# Patient Record
Sex: FEMALE | Race: WHITE | NOT HISPANIC OR LATINO | Employment: UNEMPLOYED | URBAN - METROPOLITAN AREA
[De-identification: names, ages, dates, MRNs, and addresses within clinical notes are randomized per-mention and may not be internally consistent; named-entity substitution may affect disease eponyms.]

---

## 2023-03-05 PROBLEM — I82.409 DVT (DEEP VENOUS THROMBOSIS) (MULTI): Status: ACTIVE | Noted: 2023-03-05

## 2023-03-05 PROBLEM — R42 VERTIGO: Status: ACTIVE | Noted: 2023-03-05

## 2023-03-05 PROBLEM — H01.009 BLEPHARITIS: Status: ACTIVE | Noted: 2023-03-05

## 2023-03-05 PROBLEM — I48.91 ATRIAL FIBRILLATION (MULTI): Status: ACTIVE | Noted: 2023-03-05

## 2023-03-05 PROBLEM — M05.9 RHEUMATOID ARTHRITIS WITH RHEUMATOID FACTOR (MULTI): Status: ACTIVE | Noted: 2023-03-05

## 2023-03-05 PROBLEM — K27.9 PUD (PEPTIC ULCER DISEASE): Status: ACTIVE | Noted: 2023-03-05

## 2023-03-05 PROBLEM — H43.399 VITREOUS FLOATERS: Status: ACTIVE | Noted: 2023-03-05

## 2023-03-05 PROBLEM — E61.1 LOW IRON: Status: ACTIVE | Noted: 2023-03-05

## 2023-03-05 PROBLEM — M19.90 OSTEOARTHROSIS: Status: ACTIVE | Noted: 2023-03-05

## 2023-03-05 PROBLEM — H04.123 DRY EYES: Status: ACTIVE | Noted: 2023-03-05

## 2023-03-05 PROBLEM — M19.90 ARTHRITIS: Status: ACTIVE | Noted: 2023-03-05

## 2023-03-05 PROBLEM — Z96.1 PSEUDOPHAKIA OF BOTH EYES: Status: ACTIVE | Noted: 2023-03-05

## 2023-03-05 PROBLEM — I10 ESSENTIAL HYPERTENSION: Status: ACTIVE | Noted: 2023-03-05

## 2023-03-14 ENCOUNTER — NURSING HOME VISIT (OUTPATIENT)
Dept: POST ACUTE CARE | Facility: EXTERNAL LOCATION | Age: 88
End: 2023-03-14
Payer: MEDICARE

## 2023-03-14 VITALS
WEIGHT: 121 LBS | HEART RATE: 78 BPM | BODY MASS INDEX: 19.53 KG/M2 | OXYGEN SATURATION: 96 % | TEMPERATURE: 97 F | SYSTOLIC BLOOD PRESSURE: 128 MMHG | RESPIRATION RATE: 17 BRPM | DIASTOLIC BLOOD PRESSURE: 72 MMHG

## 2023-03-14 DIAGNOSIS — D52.9 ANEMIA DUE TO FOLIC ACID DEFICIENCY, UNSPECIFIED DEFICIENCY TYPE: ICD-10-CM

## 2023-03-14 DIAGNOSIS — I10 PRIMARY HYPERTENSION: Primary | ICD-10-CM

## 2023-03-14 DIAGNOSIS — F32.A DEPRESSION, UNSPECIFIED DEPRESSION TYPE: ICD-10-CM

## 2023-03-14 PROCEDURE — 99308 SBSQ NF CARE LOW MDM 20: CPT | Performed by: NURSE PRACTITIONER

## 2023-03-14 NOTE — PROGRESS NOTES
Patient ID: Marcelle Petit is a 89 y.o. female who is long term resident being seen and evaluated for multiple medical problems.  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 Denies CP, SOB, Cough, or increased edema. RA. Denies abdominal pain, N/V, diarrhea, or constipation. Denies dysuria or hematuria. Denies change in appetite. Denies insomnia. Denies current pain.      Review of Systems ROS as described in in HPI     Physical Exam  Constitutional:       Appearance: Normal appearance.   HENT:      Head: Normocephalic.      Comments: Corrective lenses     Mouth/Throat:      Mouth: Mucous membranes are moist.   Cardiovascular:      Rate and Rhythm: Normal rate.      Comments: H/O AFIB  Pulmonary:      Effort: Pulmonary effort is normal.      Breath sounds: Normal breath sounds.   Abdominal:      General: Bowel sounds are normal.      Palpations: Abdomen is soft.   Musculoskeletal:      Cervical back: Neck supple.      Comments: Unsteady gait, Wheelchair transfers/walker    Skin:     General: Skin is warm and dry.   Neurological:      Mental Status: She is alert. Mental status is at baseline.   Psychiatric:         Mood and Affect: Mood normal.

## 2023-03-14 NOTE — LETTER
Patient: Marcelle Petit  : 1926    Date: 3/14/23    Patient ID: Marcelle Petit is a 89 y.o. female who is long term resident being seen and evaluated for multiple medical problems.  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 H/H 10.7/34.4  Folic acid 1 mg by mouth daily             Other    Depression     Makes eye contact with conversation  Mirtazapine 7.5 mg by mouth every HS               HPI:  Client denies HA, dizziness, or lightheadedness. Denies CP, SOB, Cough, or increased edema. RA. Denies abdominal pain, N/V, diarrhea, or constipation. Denies dysuria or hematuria. Denies change in appetite. Denies insomnia. Denies current pain.      Review of Systems ROS as described in in HPI     Physical Exam  Constitutional:       Appearance: Normal appearance.   HENT:      Head: Normocephalic.      Comments: Corrective lenses     Mouth/Throat:      Mouth: Mucous membranes are moist.   Cardiovascular:      Rate and Rhythm: Normal rate.      Comments: H/O AFIB  Pulmonary:      Effort: Pulmonary effort is normal.      Breath sounds: Normal breath sounds.   Abdominal:      General: Bowel sounds are normal.      Palpations: Abdomen is soft.   Musculoskeletal:      Cervical back: Neck supple.      Comments: Unsteady gait, Wheelchair transfers/walker    Skin:     General: Skin is warm and dry.   Neurological:      Mental Status: She is alert. Mental status is at baseline.   Psychiatric:         Mood and Affect: Mood normal.                 Lyndsey River, APRN-CNP

## 2023-04-04 ENCOUNTER — NURSING HOME VISIT (OUTPATIENT)
Dept: POST ACUTE CARE | Facility: EXTERNAL LOCATION | Age: 88
End: 2023-04-04
Payer: MEDICARE

## 2023-04-04 DIAGNOSIS — E55.9 VITAMIN D DEFICIENCY: ICD-10-CM

## 2023-04-04 DIAGNOSIS — F32.0 CURRENT MILD EPISODE OF MAJOR DEPRESSIVE DISORDER, UNSPECIFIED WHETHER RECURRENT (CMS-HCC): ICD-10-CM

## 2023-04-04 DIAGNOSIS — I10 PRIMARY HYPERTENSION: Primary | ICD-10-CM

## 2023-04-04 PROCEDURE — 99308 SBSQ NF CARE LOW MDM 20: CPT | Performed by: NURSE PRACTITIONER

## 2023-04-04 NOTE — LETTER
Patient: Marcelle Petit  : 1926    Encounter Date: 2023    Patient ID: Marcelle Petit is a 89 y.o. female who is long term resident being seen and evaluated for multiple medical problems.  [id number]   1926    /86   Pulse 80   Temp 36.7 °C (98 °F)   Resp 16   Wt 55.8 kg (123 lb)   SpO2 97%   BMI 19.85 kg/m²     Assessment/Plan  Problem List Items Addressed This Visit          Circulatory    Primary hypertension - Primary     Losartan 50 mg by mouth daily   Cardizem  mg by mouth daily  ASA 81 mg by mouth daily             Endocrine/Metabolic    Vitamin D deficiency     D3 2000 units by mouth daily   3/22/2023 D- 23.6            Other    Depression     Makes eye contact with conversation  Mirtazapine 7.5 mg by mouth every HS               HPI: No reported behavioral issues, Dementia.  Client denies HA, dizziness, or lightheadedness. Denies CP, SOB, or increased edema. RA. Denies abdominal pain, N/V, diarrhea, or constipation. Denies dysuria. Denies change in appetite. Denies insomnia. Denies current pain.      Review of Systems Limited ROS -Dementia     Physical Exam  Constitutional:       Comments: Tired  Sitting in recliner      HENT:      Head: Normocephalic.      Mouth/Throat:      Mouth: Mucous membranes are moist.   Cardiovascular:      Rate and Rhythm: Normal rate.      Comments: H/O PAF  Pulmonary:      Effort: Pulmonary effort is normal.      Breath sounds: Normal breath sounds.      Comments: RA  Abdominal:      General: Bowel sounds are normal.   Genitourinary:     Comments: Denies dysuria   Musculoskeletal:      Cervical back: Neck supple.      Comments: Physical deconditioning    Skin:     General: Skin is warm and dry.   Neurological:      Mental Status: She is alert. Mental status is at baseline.   Psychiatric:         Mood and Affect: Mood normal.      Comments: No current heightened anxiety                    Electronically Signed By: VIKKI Echols   23  8:04  AM

## 2023-04-05 VITALS
BODY MASS INDEX: 19.85 KG/M2 | WEIGHT: 123 LBS | OXYGEN SATURATION: 97 % | DIASTOLIC BLOOD PRESSURE: 86 MMHG | RESPIRATION RATE: 16 BRPM | TEMPERATURE: 98 F | HEART RATE: 80 BPM | SYSTOLIC BLOOD PRESSURE: 130 MMHG

## 2023-04-05 PROBLEM — E55.9 VITAMIN D DEFICIENCY: Status: ACTIVE | Noted: 2023-04-05

## 2023-04-05 NOTE — PROGRESS NOTES
Patient ID: Marcelle Petit is a 89 y.o. female who is long term resident being seen and evaluated for multiple medical problems.  [id number]   7/6/1926    /86   Pulse 80   Temp 36.7 °C (98 °F)   Resp 16   Wt 55.8 kg (123 lb)   SpO2 97%   BMI 19.85 kg/m²     Assessment/Plan  Problem List Items Addressed This Visit          Circulatory    Primary hypertension - Primary     Losartan 50 mg by mouth daily   Cardizem  mg by mouth daily  ASA 81 mg by mouth daily             Endocrine/Metabolic    Vitamin D deficiency     D3 2000 units by mouth daily   3/22/2023 D- 23.6            Other    Depression     Makes eye contact with conversation  Mirtazapine 7.5 mg by mouth every HS               HPI: No reported behavioral issues, Dementia.  Client denies HA, dizziness, or lightheadedness. Denies CP, SOB, or increased edema. RA. Denies abdominal pain, N/V, diarrhea, or constipation. Denies dysuria. Denies change in appetite. Denies insomnia. Denies current pain.      Review of Systems Limited ROS -Dementia     Physical Exam  Constitutional:       Comments: Tired  Sitting in recliner      HENT:      Head: Normocephalic.      Mouth/Throat:      Mouth: Mucous membranes are moist.   Cardiovascular:      Rate and Rhythm: Normal rate.      Comments: H/O PAF  Pulmonary:      Effort: Pulmonary effort is normal.      Breath sounds: Normal breath sounds.      Comments: RA  Abdominal:      General: Bowel sounds are normal.   Genitourinary:     Comments: Denies dysuria   Musculoskeletal:      Cervical back: Neck supple.      Comments: Physical deconditioning    Skin:     General: Skin is warm and dry.   Neurological:      Mental Status: She is alert. Mental status is at baseline.   Psychiatric:         Mood and Affect: Mood normal.      Comments: No current heightened anxiety

## 2023-05-30 ENCOUNTER — NURSING HOME VISIT (OUTPATIENT)
Dept: POST ACUTE CARE | Facility: EXTERNAL LOCATION | Age: 88
End: 2023-05-30
Payer: MEDICARE

## 2023-05-30 DIAGNOSIS — F32.0 CURRENT MILD EPISODE OF MAJOR DEPRESSIVE DISORDER, UNSPECIFIED WHETHER RECURRENT (CMS-HCC): ICD-10-CM

## 2023-05-30 DIAGNOSIS — I10 PRIMARY HYPERTENSION: ICD-10-CM

## 2023-05-30 DIAGNOSIS — E55.9 VITAMIN D DEFICIENCY: Primary | ICD-10-CM

## 2023-05-30 PROCEDURE — 99308 SBSQ NF CARE LOW MDM 20: CPT | Performed by: NURSE PRACTITIONER

## 2023-05-30 NOTE — LETTER
Patient: Marcelle Petit  : 1926    Encounter Date: 2023    Patient ID: Marcelle Petit is a 89 y.o. female who is long term resident being seen and evaluated for multiple medical problems.  [id number]   1926    /82   Pulse 76   Temp 36.7 °C (98 °F)   Resp 16   Wt 53.8 kg (118 lb 8 oz)   SpO2 97%   BMI 19.13 kg/m²     Assessment/Plan  Problem List Items Addressed This Visit          Circulatory    Primary hypertension     Losartan 50 mg by mouth daily   Cardizem  mg by mouth daily  ASA 81 mg by mouth daily             Endocrine/Metabolic    Vitamin D deficiency - Primary     D3 2000 units by mouth daily   3/22/2023 D- 23.6            Other    Depression     Mirtazapine 7.5 mg by mouth every HS                HPI: Client denies HA, dizziness, or lightheadedness. Denies CP, SOB, Cough, or increased edema. RA. Denies abdominal pain, N/V, diarrhea, or constipation. Denies dysuria. Denies change in appetite. Denies insomnia. Denies current pain.      Review of Systems Limited ROS memory impairment     Physical Exam  Constitutional:       Appearance: Normal appearance.      Comments: Sitting in recliner    HENT:      Head: Normocephalic.      Mouth/Throat:      Mouth: Mucous membranes are moist.   Cardiovascular:      Rate and Rhythm: Normal rate.   Pulmonary:      Effort: Pulmonary effort is normal.      Breath sounds: Normal breath sounds.      Comments: RA  Abdominal:      General: Bowel sounds are normal.   Musculoskeletal:      Cervical back: Neck supple.      Comments: WC  Walker    Skin:     General: Skin is warm and dry.   Neurological:      Mental Status: She is alert. Mental status is at baseline.   Psychiatric:         Mood and Affect: Mood normal.      Comments: Follows direction                    Electronically Signed By: VIKKI Echols   23  6:11 PM

## 2023-05-31 VITALS
RESPIRATION RATE: 16 BRPM | HEART RATE: 76 BPM | DIASTOLIC BLOOD PRESSURE: 82 MMHG | WEIGHT: 118.5 LBS | OXYGEN SATURATION: 97 % | TEMPERATURE: 98 F | BODY MASS INDEX: 19.13 KG/M2 | SYSTOLIC BLOOD PRESSURE: 126 MMHG

## 2023-05-31 NOTE — PROGRESS NOTES
Patient ID: Marcelle Petit is a 89 y.o. female who is long term resident being seen and evaluated for multiple medical problems.  [id number]   7/6/1926    /82   Pulse 76   Temp 36.7 °C (98 °F)   Resp 16   Wt 53.8 kg (118 lb 8 oz)   SpO2 97%   BMI 19.13 kg/m²     Assessment/Plan  Problem List Items Addressed This Visit          Circulatory    Primary hypertension     Losartan 50 mg by mouth daily   Cardizem  mg by mouth daily  ASA 81 mg by mouth daily             Endocrine/Metabolic    Vitamin D deficiency - Primary     D3 2000 units by mouth daily   3/22/2023 D- 23.6            Other    Depression     Mirtazapine 7.5 mg by mouth every HS                HPI: Client denies HA, dizziness, or lightheadedness. Denies CP, SOB, Cough, or increased edema. RA. Denies abdominal pain, N/V, diarrhea, or constipation. Denies dysuria. Denies change in appetite. Denies insomnia. Denies current pain.      Review of Systems Limited ROS memory impairment     Physical Exam  Constitutional:       Appearance: Normal appearance.      Comments: Sitting in recliner    HENT:      Head: Normocephalic.      Mouth/Throat:      Mouth: Mucous membranes are moist.   Cardiovascular:      Rate and Rhythm: Normal rate.   Pulmonary:      Effort: Pulmonary effort is normal.      Breath sounds: Normal breath sounds.      Comments: RA  Abdominal:      General: Bowel sounds are normal.   Musculoskeletal:      Cervical back: Neck supple.      Comments: WC  Walker    Skin:     General: Skin is warm and dry.   Neurological:      Mental Status: She is alert. Mental status is at baseline.   Psychiatric:         Mood and Affect: Mood normal.      Comments: Follows direction

## 2023-07-04 ENCOUNTER — NURSING HOME VISIT (OUTPATIENT)
Dept: POST ACUTE CARE | Facility: EXTERNAL LOCATION | Age: 88
End: 2023-07-04
Payer: MEDICARE

## 2023-07-04 DIAGNOSIS — M05.9 RHEUMATOID ARTHRITIS WITH POSITIVE RHEUMATOID FACTOR, INVOLVING UNSPECIFIED SITE (MULTI): Primary | ICD-10-CM

## 2023-07-04 DIAGNOSIS — E55.9 VITAMIN D DEFICIENCY: ICD-10-CM

## 2023-07-04 DIAGNOSIS — I10 PRIMARY HYPERTENSION: ICD-10-CM

## 2023-07-04 PROCEDURE — 99308 SBSQ NF CARE LOW MDM 20: CPT | Performed by: NURSE PRACTITIONER

## 2023-07-04 NOTE — LETTER
Patient: Marcelle Petit  : 1926    Encounter Date: 2023    Patient ID: Marcelle Petit is a 89 y.o. female who is long term resident being seen and evaluated for multiple medical problems.  [id number]   1926    /82   Pulse 76   Temp 36.4 °C (97.6 °F)   Resp 15   Wt 55.8 kg (123 lb)   SpO2 97%   BMI 19.85 kg/m²     Assessment/Plan  Problem List Items Addressed This Visit          Cardiac and Vasculature    Primary hypertension     Losartan 50 mg by mouth daily   Cardizem  mg by mouth daily  ASA 81 mg by mouth daily             Endocrine/Metabolic    Vitamin D deficiency     D3 2000 units by mouth daily             Multi-system (Lupus, Sarcoid)    Rheumatoid arthritis with rheumatoid factor (CMS/HCC) - Primary     Methotrexate 7.5 mg by mouth every Wednesday   Calcium/D3 600 mg/ 5 mcg by mouth BID               HPI: Client denies HA, dizziness, or lightheadedness. Denies CP, SOB, Cough, or increased edema. RA. Denies abdominal pain, N/V, diarrhea, or constipation. Denies dysuria. Denies change in appetite. Denies insomnia. Denies current pain.      Review of Systems Limited ROS. Poor historian     Physical Exam  Constitutional:       Comments: Resting in bed    HENT:      Head: Normocephalic.      Mouth/Throat:      Mouth: Mucous membranes are moist.   Cardiovascular:      Rate and Rhythm: Normal rate.      Comments: H/O PAF  Pulmonary:      Effort: Pulmonary effort is normal.      Breath sounds: Normal breath sounds.      Comments: RA  Abdominal:      General: Bowel sounds are normal.   Genitourinary:     Comments: Denies dysuria   Musculoskeletal:      Cervical back: Neck supple.      Comments: Walker  WC transfers    Skin:     General: Skin is warm and dry.   Neurological:      Mental Status: She is alert. Mental status is at baseline.   Psychiatric:         Mood and Affect: Mood normal.      Comments: No current heightened anxiety                    Electronically Signed By: Lyndsey River  MOLLY-CNP   7/6/23  8:21 AM

## 2023-07-06 VITALS
HEART RATE: 76 BPM | DIASTOLIC BLOOD PRESSURE: 82 MMHG | TEMPERATURE: 97.6 F | SYSTOLIC BLOOD PRESSURE: 126 MMHG | RESPIRATION RATE: 15 BRPM | BODY MASS INDEX: 19.85 KG/M2 | OXYGEN SATURATION: 97 % | WEIGHT: 123 LBS

## 2023-07-06 PROBLEM — G30.1: Status: ACTIVE | Noted: 2023-07-06

## 2023-07-06 NOTE — PROGRESS NOTES
Patient ID: Marcelle Petit is a 89 y.o. female who is long term resident being seen and evaluated for multiple medical problems.  [id number]   7/6/1926    /82   Pulse 76   Temp 36.4 °C (97.6 °F)   Resp 15   Wt 55.8 kg (123 lb)   SpO2 97%   BMI 19.85 kg/m²     Assessment/Plan  Problem List Items Addressed This Visit          Cardiac and Vasculature    Primary hypertension     Losartan 50 mg by mouth daily   Cardizem  mg by mouth daily  ASA 81 mg by mouth daily             Endocrine/Metabolic    Vitamin D deficiency     D3 2000 units by mouth daily             Multi-system (Lupus, Sarcoid)    Rheumatoid arthritis with rheumatoid factor (CMS/HCC) - Primary     Methotrexate 7.5 mg by mouth every Wednesday   Calcium/D3 600 mg/ 5 mcg by mouth BID               HPI: Client denies HA, dizziness, or lightheadedness. Denies CP, SOB, Cough, or increased edema. RA. Denies abdominal pain, N/V, diarrhea, or constipation. Denies dysuria. Denies change in appetite. Denies insomnia. Denies current pain.      Review of Systems Limited ROS. Poor historian     Physical Exam  Constitutional:       Comments: Resting in bed    HENT:      Head: Normocephalic.      Mouth/Throat:      Mouth: Mucous membranes are moist.   Cardiovascular:      Rate and Rhythm: Normal rate.      Comments: H/O PAF  Pulmonary:      Effort: Pulmonary effort is normal.      Breath sounds: Normal breath sounds.      Comments: RA  Abdominal:      General: Bowel sounds are normal.   Genitourinary:     Comments: Denies dysuria   Musculoskeletal:      Cervical back: Neck supple.      Comments: Walker  WC transfers    Skin:     General: Skin is warm and dry.   Neurological:      Mental Status: She is alert. Mental status is at baseline.   Psychiatric:         Mood and Affect: Mood normal.      Comments: No current heightened anxiety

## 2023-08-29 ENCOUNTER — NURSING HOME VISIT (OUTPATIENT)
Dept: POST ACUTE CARE | Facility: EXTERNAL LOCATION | Age: 88
End: 2023-08-29
Payer: MEDICARE

## 2023-08-29 DIAGNOSIS — R30.0 DYSURIA: Primary | ICD-10-CM

## 2023-08-29 PROCEDURE — 99308 SBSQ NF CARE LOW MDM 20: CPT | Performed by: NURSE PRACTITIONER

## 2023-08-29 NOTE — LETTER
Patient: Marcelle Petit  : 1926    Encounter Date: 2023    Patient ID: Marcelle Petit is a 89 y.o. female who is long term resident being seen and evaluated for multiple medical problems.  [id number]   1926    /83   Pulse 76   Temp 36.7 °C (98.1 °F)   Resp 15   Wt 55.8 kg (123 lb)   SpO2 97%   BMI 19.85 kg/m²     Assessment/Plan  Problem List Items Addressed This Visit       Dysuria - Primary     Urine dip positive   Augmentin 500/125 mg by mouth BID (Stop date 2023)   2023 WBC 15.8  2023 BUN/Creat 41/0.99   Encourage fluid intake               HPI: Staff reported client C/O dysuria and foul smelling urine, urine dip positive. Prescribed Augmentin. Staff report client doing better today, afebrile and up in WC. Client denies HA, dizziness, or lightheadedness. Denies CP, SOB, or Cough.  Denies abdominal pain or N/V.  No current C/O pain.     Review of Systems ROS as described in in HPI     Physical Exam  Constitutional:       Comments: Sitting in WC   HENT:      Mouth/Throat:      Mouth: Mucous membranes are moist.   Cardiovascular:      Rate and Rhythm: Normal rate.   Pulmonary:      Effort: Pulmonary effort is normal.   Abdominal:      General: Bowel sounds are normal.   Musculoskeletal:      Cervical back: Neck supple.      Comments: WC transfers   Unsteady gait   Skin:     General: Skin is warm and dry.   Neurological:      Mental Status: She is alert. Mental status is at baseline.   Psychiatric:         Mood and Affect: Mood normal.      Comments: No current heightened anxiety                    Electronically Signed By: VIKKI Echols   23  2:26 PM

## 2023-08-30 VITALS
TEMPERATURE: 98.1 F | RESPIRATION RATE: 15 BRPM | WEIGHT: 123 LBS | HEART RATE: 76 BPM | DIASTOLIC BLOOD PRESSURE: 83 MMHG | SYSTOLIC BLOOD PRESSURE: 126 MMHG | BODY MASS INDEX: 19.85 KG/M2 | OXYGEN SATURATION: 97 %

## 2023-08-30 PROBLEM — Z96.652 STATUS POST TOTAL LEFT KNEE REPLACEMENT: Status: ACTIVE | Noted: 2023-08-30

## 2023-08-30 PROBLEM — R30.0 DYSURIA: Status: ACTIVE | Noted: 2023-08-30

## 2023-08-30 ASSESSMENT — ENCOUNTER SYMPTOMS
DEPRESSION: 0
LOSS OF SENSATION IN FEET: 0
OCCASIONAL FEELINGS OF UNSTEADINESS: 1

## 2023-08-30 NOTE — ASSESSMENT & PLAN NOTE
Urine dip positive   Augmentin 500/125 mg by mouth BID (Stop date 9/5/2023)   8/30/2023 WBC 15.8  8/30/2023 BUN/Creat 41/0.99   Encourage fluid intake

## 2023-08-30 NOTE — PROGRESS NOTES
Patient ID: Marcelle Petit is a 89 y.o. female who is long term resident being seen and evaluated for multiple medical problems.  [id number]   7/6/1926    /83   Pulse 76   Temp 36.7 °C (98.1 °F)   Resp 15   Wt 55.8 kg (123 lb)   SpO2 97%   BMI 19.85 kg/m²     Assessment/Plan  Problem List Items Addressed This Visit       Dysuria - Primary     Urine dip positive   Augmentin 500/125 mg by mouth BID (Stop date 9/5/2023)   8/30/2023 WBC 15.8  8/30/2023 BUN/Creat 41/0.99   Encourage fluid intake               HPI: Staff reported client C/O dysuria and foul smelling urine, urine dip positive. Prescribed Augmentin. Staff report client doing better today, afebrile and up in WC. Client denies HA, dizziness, or lightheadedness. Denies CP, SOB, or Cough.  Denies abdominal pain or N/V.  No current C/O pain.     Review of Systems ROS as described in in HPI     Physical Exam  Constitutional:       Comments: Sitting in WC   HENT:      Mouth/Throat:      Mouth: Mucous membranes are moist.   Cardiovascular:      Rate and Rhythm: Normal rate.   Pulmonary:      Effort: Pulmonary effort is normal.   Abdominal:      General: Bowel sounds are normal.   Musculoskeletal:      Cervical back: Neck supple.      Comments: WC transfers   Unsteady gait   Skin:     General: Skin is warm and dry.   Neurological:      Mental Status: She is alert. Mental status is at baseline.   Psychiatric:         Mood and Affect: Mood normal.      Comments: No current heightened anxiety

## 2023-09-05 ENCOUNTER — NURSING HOME VISIT (OUTPATIENT)
Dept: POST ACUTE CARE | Facility: EXTERNAL LOCATION | Age: 88
End: 2023-09-05
Payer: MEDICARE

## 2023-09-05 DIAGNOSIS — R30.0 DYSURIA: Primary | ICD-10-CM

## 2023-09-05 DIAGNOSIS — F32.0 CURRENT MILD EPISODE OF MAJOR DEPRESSIVE DISORDER, UNSPECIFIED WHETHER RECURRENT (CMS-HCC): ICD-10-CM

## 2023-09-05 PROCEDURE — 99308 SBSQ NF CARE LOW MDM 20: CPT | Performed by: NURSE PRACTITIONER

## 2023-09-05 NOTE — LETTER
Patient: Marcelle Petit  : 1926    Encounter Date: 2023    Patient ID: Marcelle Petit is a 89 y.o. female who is long term resident being seen and evaluated for multiple medical problems.  [id number]   1926    /82   Pulse 76   Temp 36.1 °C (97 °F)   Resp 15   Wt 55.8 kg (123 lb)   SpO2 97%   BMI 19.85 kg/m²     Assessment/Plan  Problem List Items Addressed This Visit       Depression     Sertraline 50 mg by mouth daily          Dysuria - Primary     Urine dip positive   Augmentin 500/125 mg by mouth BID (Stop date 2023)   2023 WBC 15.8  2023 BUN/Creat 41/0.99   Encourage fluid intake               HPI: Client continues to receive Augmentin for a UTI. Client [place]. Client denies HA, dizziness, or lightheadedness. Denies CP, SOB, Cough, or increased edema. Denies abdominal pain, N/V, diarrhea, or constipation. Denies current dysuria. Denies change in appetite. Denies current pain.      Review of Systems: Limited, poor historian     Physical Exam  Constitutional:       Comments: Sitting in WC   HENT:      Mouth/Throat:      Mouth: Mucous membranes are moist.   Cardiovascular:      Rate and Rhythm: Normal rate.   Pulmonary:      Effort: Pulmonary effort is normal.      Breath sounds: Normal breath sounds.      Comments: RA  Abdominal:      General: Bowel sounds are normal.   Genitourinary:     Comments: Denies dysuria   Musculoskeletal:      Cervical back: Neck supple.      Right lower leg: Edema (slight) present.      Left lower leg: Edema (slight) present.      Comments: WC transfers    Skin:     General: Skin is warm and dry.   Neurological:      Mental Status: She is alert. Mental status is at baseline.   Psychiatric:         Mood and Affect: Mood normal.                   Electronically Signed By: VIKKI Echols   23 10:38 AM

## 2023-09-06 VITALS
OXYGEN SATURATION: 97 % | DIASTOLIC BLOOD PRESSURE: 82 MMHG | SYSTOLIC BLOOD PRESSURE: 126 MMHG | RESPIRATION RATE: 15 BRPM | TEMPERATURE: 97 F | HEART RATE: 76 BPM | WEIGHT: 123 LBS | BODY MASS INDEX: 19.85 KG/M2

## 2023-09-06 NOTE — PROGRESS NOTES
Patient ID: Marcelle Petit is a 89 y.o. female who is long term resident being seen and evaluated for multiple medical problems.  [id number]   7/6/1926    /82   Pulse 76   Temp 36.1 °C (97 °F)   Resp 15   Wt 55.8 kg (123 lb)   SpO2 97%   BMI 19.85 kg/m²     Assessment/Plan  Problem List Items Addressed This Visit       Depression     Sertraline 50 mg by mouth daily          Dysuria - Primary     Urine dip positive   Augmentin 500/125 mg by mouth BID (Stop date 9/5/2023)   8/30/2023 WBC 15.8  8/30/2023 BUN/Creat 41/0.99   Encourage fluid intake               HPI: Client continues to receive Augmentin for a UTI. Client [place]. Client denies HA, dizziness, or lightheadedness. Denies CP, SOB, Cough, or increased edema. Denies abdominal pain, N/V, diarrhea, or constipation. Denies current dysuria. Denies change in appetite. Denies current pain.      Review of Systems: Limited, poor historian     Physical Exam  Constitutional:       Comments: Sitting in WC   HENT:      Mouth/Throat:      Mouth: Mucous membranes are moist.   Cardiovascular:      Rate and Rhythm: Normal rate.   Pulmonary:      Effort: Pulmonary effort is normal.      Breath sounds: Normal breath sounds.      Comments: RA  Abdominal:      General: Bowel sounds are normal.   Genitourinary:     Comments: Denies dysuria   Musculoskeletal:      Cervical back: Neck supple.      Right lower leg: Edema (slight) present.      Left lower leg: Edema (slight) present.      Comments: WC transfers    Skin:     General: Skin is warm and dry.   Neurological:      Mental Status: She is alert. Mental status is at baseline.   Psychiatric:         Mood and Affect: Mood normal.

## 2023-09-26 ENCOUNTER — NURSING HOME VISIT (OUTPATIENT)
Dept: POST ACUTE CARE | Facility: EXTERNAL LOCATION | Age: 88
End: 2023-09-26
Payer: MEDICARE

## 2023-09-26 DIAGNOSIS — J18.9 PNEUMONIA OF BOTH LUNGS DUE TO INFECTIOUS ORGANISM, UNSPECIFIED PART OF LUNG: ICD-10-CM

## 2023-09-26 DIAGNOSIS — I10 PRIMARY HYPERTENSION: Primary | ICD-10-CM

## 2023-09-26 PROCEDURE — 99308 SBSQ NF CARE LOW MDM 20: CPT | Performed by: NURSE PRACTITIONER

## 2023-09-26 NOTE — LETTER
Patient: Marcelle Petit  : 1926    Encounter Date: 2023    Patient ID: Marcelle Petit is a 89 y.o. female who is long term resident being seen and evaluated for multiple medical problems.  [id number]   1926    /70   Pulse 70   Temp 36.7 °C (98 °F)   Resp 16   Wt 55.8 kg (123 lb)   SpO2 94%   BMI 19.89 kg/m²     Assessment/Plan  Problem List Items Addressed This Visit       Primary hypertension - Primary     Losartan 50 mg by mouth daily   Cardizem  mg by mouth daily  ASA 81 mg by mouth daily          Pneumonia of both lungs due to infectious organism     Ceftin 500 mg by mouth BID (Stop date 2023)   Duoneb every 6 hrs as needed  3034 CXR- Bibasilar opacities  2023 WBC 25.37  2023 Repeat CBC with diff and BMP               HPI: Client continues Ceftin for Pneumonia. Staff report RA SPO2 today 91%, placed on 2 lit NC. Client denies HA, dizziness, or lightheadedness. Denies CP, SOB, or increased edema. C/O cough. O2 2 lit NC. Denies abdominal pain, N/V, diarrhea, or constipation. Denies dysuria. Denies change in appetite. Denies current pain.       Review of Systems Limited ROS, poor historian     Physical Exam  Constitutional:       Comments: Resting in bed    HENT:      Mouth/Throat:      Mouth: Mucous membranes are moist.   Cardiovascular:      Rate and Rhythm: Normal rate.      Comments: H/O PAF  Pulmonary:      Effort: Pulmonary effort is normal.      Comments: 2 lit NC  Abdominal:      General: Bowel sounds are normal.   Genitourinary:     Comments: Denies dysuria   Musculoskeletal:      Cervical back: Neck supple.      Comments: WC transfers    Skin:     General: Skin is warm and dry.   Neurological:      Mental Status: She is alert. Mental status is at baseline.   Psychiatric:         Mood and Affect: Mood normal.                   Electronically Signed By: VIKKI Echols   23  7:17 AM

## 2023-09-27 VITALS
RESPIRATION RATE: 16 BRPM | TEMPERATURE: 98 F | OXYGEN SATURATION: 94 % | DIASTOLIC BLOOD PRESSURE: 70 MMHG | WEIGHT: 123 LBS | BODY MASS INDEX: 19.89 KG/M2 | HEART RATE: 70 BPM | SYSTOLIC BLOOD PRESSURE: 110 MMHG

## 2023-09-27 PROBLEM — J18.9 PNEUMONIA OF BOTH LUNGS DUE TO INFECTIOUS ORGANISM: Status: ACTIVE | Noted: 2023-09-27

## 2023-09-27 PROBLEM — I82.409 DVT (DEEP VENOUS THROMBOSIS) (MULTI): Status: RESOLVED | Noted: 2023-03-05 | Resolved: 2023-09-27

## 2023-09-27 NOTE — PROGRESS NOTES
Patient ID: Marcelle Petit is a 89 y.o. female who is long term resident being seen and evaluated for multiple medical problems.  [id number]   7/6/1926    /70   Pulse 70   Temp 36.7 °C (98 °F)   Resp 16   Wt 55.8 kg (123 lb)   SpO2 94%   BMI 19.89 kg/m²     Assessment/Plan  Problem List Items Addressed This Visit       Primary hypertension - Primary     Losartan 50 mg by mouth daily   Cardizem  mg by mouth daily  ASA 81 mg by mouth daily          Pneumonia of both lungs due to infectious organism     Ceftin 500 mg by mouth BID (Stop date 9/30/2023)   Duoneb every 6 hrs as needed  9/20/3034 CXR- Bibasilar opacities  9/20/2023 WBC 25.37  9/27/2023 Repeat CBC with diff and BMP               HPI: Client continues Ceftin for Pneumonia. Staff report RA SPO2 today 91%, placed on 2 lit NC. Client denies HA, dizziness, or lightheadedness. Denies CP, SOB, or increased edema. C/O cough. O2 2 lit NC. Denies abdominal pain, N/V, diarrhea, or constipation. Denies dysuria. Denies change in appetite. Denies current pain.       Review of Systems Limited ROS, poor historian     Physical Exam  Constitutional:       Comments: Resting in bed    HENT:      Mouth/Throat:      Mouth: Mucous membranes are moist.   Cardiovascular:      Rate and Rhythm: Normal rate.      Comments: H/O PAF  Pulmonary:      Effort: Pulmonary effort is normal.      Comments: 2 lit NC  Abdominal:      General: Bowel sounds are normal.   Genitourinary:     Comments: Denies dysuria   Musculoskeletal:      Cervical back: Neck supple.      Comments: WC transfers    Skin:     General: Skin is warm and dry.   Neurological:      Mental Status: She is alert. Mental status is at baseline.   Psychiatric:         Mood and Affect: Mood normal.

## 2023-09-27 NOTE — ASSESSMENT & PLAN NOTE
Ceftin 500 mg by mouth BID (Stop date 9/30/2023)   Duoneb every 6 hrs as needed  9/20/3034 CXR- Bibasilar opacities  9/20/2023 WBC 25.37  9/27/2023 Repeat CBC with diff and BMP

## 2023-10-03 ENCOUNTER — NURSING HOME VISIT (OUTPATIENT)
Dept: POST ACUTE CARE | Facility: EXTERNAL LOCATION | Age: 88
End: 2023-10-03
Payer: MEDICARE

## 2023-10-03 DIAGNOSIS — I10 PRIMARY HYPERTENSION: ICD-10-CM

## 2023-10-03 DIAGNOSIS — J18.9 PNEUMONIA OF BOTH LUNGS DUE TO INFECTIOUS ORGANISM, UNSPECIFIED PART OF LUNG: Primary | ICD-10-CM

## 2023-10-03 DIAGNOSIS — F32.0 CURRENT MILD EPISODE OF MAJOR DEPRESSIVE DISORDER, UNSPECIFIED WHETHER RECURRENT (CMS-HCC): ICD-10-CM

## 2023-10-03 PROCEDURE — 99308 SBSQ NF CARE LOW MDM 20: CPT | Performed by: NURSE PRACTITIONER

## 2023-10-03 NOTE — LETTER
Patient: Marcelle Petit  : 1926    Encounter Date: 10/03/2023    Patient ID: Marcelle Petit is a 89 y.o. female who is long term resident being seen and evaluated for multiple medical problems.  [id number]   1926    /74   Pulse 78   Temp 36.7 °C (98.1 °F)   Resp 16   Wt 55.8 kg (123 lb)   SpO2 98%   BMI 19.89 kg/m²     Assessment/Plan  Problem List Items Addressed This Visit       Primary hypertension     Losartan 50 mg by mouth daily   Cardizem  mg by mouth daily  ASA 81 mg by mouth daily   H/O PAF         Depression     Mirtazapine 7.5 mg by mouth daily           Pneumonia of both lungs due to infectious organism - Primary     Ceftin 500 mg by mouth BID completed 2023   Duoneb every 6 hrs as needed  3034 CXR- Bibasilar opacities  2023 WBC 25.37  2023 WBC 14.8  10/6/2023 Repeat BMP and CBC with diff               HPI: Client completed Ceftin for Pneumonia. Client denies HA or dizziness. Denies CP or SOB. O2 3 lit NC. Denies abdominal pain, or N/V. Denies dysuria.  Staff report client's appetite has decreased. She denies current pain. She C/O feeling tired.     Review of Systems Limited ROS,limited participation     Physical Exam  Constitutional:       Comments: Resting in bed    HENT:      Mouth/Throat:      Mouth: Mucous membranes are moist.   Cardiovascular:      Rate and Rhythm: Normal rate.      Comments: H/O PAF  Pulmonary:      Effort: Pulmonary effort is normal.      Comments: 3 lit NC  Abdominal:      General: Bowel sounds are normal.   Musculoskeletal:      Cervical back: Neck supple.      Comments: WC transfers   Generalized weakness    Skin:     General: Skin is warm and dry.   Neurological:      Mental Status: She is alert. Mental status is at baseline.   Psychiatric:      Comments: No current heightened anxiety                    Electronically Signed By: VIKKI Echols   10/4/23  1:58 PM

## 2023-10-04 VITALS
HEART RATE: 78 BPM | DIASTOLIC BLOOD PRESSURE: 74 MMHG | RESPIRATION RATE: 16 BRPM | SYSTOLIC BLOOD PRESSURE: 118 MMHG | BODY MASS INDEX: 19.89 KG/M2 | WEIGHT: 123 LBS | TEMPERATURE: 98.1 F | OXYGEN SATURATION: 98 %

## 2023-10-04 NOTE — ASSESSMENT & PLAN NOTE
Ceftin 500 mg by mouth BID completed 9/30/2023   Duoneb every 6 hrs as needed  9/20/3034 CXR- Bibasilar opacities  9/20/2023 WBC 25.37  9/27/2023 WBC 14.8  10/6/2023 Repeat BMP and CBC with diff

## 2023-10-04 NOTE — PROGRESS NOTES
Patient ID: Marcelle Petit is a 89 y.o. female who is long term resident being seen and evaluated for multiple medical problems.  [id number]   7/6/1926    /74   Pulse 78   Temp 36.7 °C (98.1 °F)   Resp 16   Wt 55.8 kg (123 lb)   SpO2 98%   BMI 19.89 kg/m²     Assessment/Plan  Problem List Items Addressed This Visit       Primary hypertension     Losartan 50 mg by mouth daily   Cardizem  mg by mouth daily  ASA 81 mg by mouth daily   H/O PAF         Depression     Mirtazapine 7.5 mg by mouth daily           Pneumonia of both lungs due to infectious organism - Primary     Ceftin 500 mg by mouth BID completed 9/30/2023   Duoneb every 6 hrs as needed  9/20/3034 CXR- Bibasilar opacities  9/20/2023 WBC 25.37  9/27/2023 WBC 14.8  10/6/2023 Repeat BMP and CBC with diff               HPI: Client completed Ceftin for Pneumonia. Client denies HA or dizziness. Denies CP or SOB. O2 3 lit NC. Denies abdominal pain, or N/V. Denies dysuria.  Staff report client's appetite has decreased. She denies current pain. She C/O feeling tired.     Review of Systems Limited ROS,limited participation     Physical Exam  Constitutional:       Comments: Resting in bed    HENT:      Mouth/Throat:      Mouth: Mucous membranes are moist.   Cardiovascular:      Rate and Rhythm: Normal rate.      Comments: H/O PAF  Pulmonary:      Effort: Pulmonary effort is normal.      Comments: 3 lit NC  Abdominal:      General: Bowel sounds are normal.   Musculoskeletal:      Cervical back: Neck supple.      Comments: WC transfers   Generalized weakness    Skin:     General: Skin is warm and dry.   Neurological:      Mental Status: She is alert. Mental status is at baseline.   Psychiatric:      Comments: No current heightened anxiety

## 2023-10-10 ENCOUNTER — NURSING HOME VISIT (OUTPATIENT)
Dept: POST ACUTE CARE | Facility: EXTERNAL LOCATION | Age: 88
End: 2023-10-10
Payer: MEDICARE

## 2023-10-10 DIAGNOSIS — F41.9 ANXIETY: ICD-10-CM

## 2023-10-10 DIAGNOSIS — Z51.5 HOSPICE CARE PATIENT: Primary | ICD-10-CM

## 2023-10-10 DIAGNOSIS — I10 PRIMARY HYPERTENSION: ICD-10-CM

## 2023-10-10 DIAGNOSIS — R52 PAIN: ICD-10-CM

## 2023-10-10 PROCEDURE — 99308 SBSQ NF CARE LOW MDM 20: CPT | Performed by: NURSE PRACTITIONER

## 2023-10-10 NOTE — LETTER
Patient: Marcelle Petit  : 1926    Encounter Date: 10/10/2023    Patient ID: Marcelle Petit is a 89 y.o. female who is long term resident being seen and evaluated for multiple medical problems.  [id number]   1926    /78   Pulse 80   Temp 36.7 °C (98 °F)   Resp 17   Wt 56.2 kg (123 lb 12.8 oz)   SpO2 98%   BMI 20.02 kg/m²     Assessment/Plan  Problem List Items Addressed This Visit       Primary hypertension     Losartan 50 mg by mouth daily   Cardizem  mg by mouth daily  ASA 81 mg by mouth daily   H/O PAF         Pain     Morphine 5 mg  by mouth every 4 hrs as needed         Anxiety     Ativan 0.5 mg by mouth every 6 hrs as needed          Hospice care patient - Primary     [facility]   Cerebral atherosclerosis               HPI: Client now receiving [facility] services, Cerebral arthrosclerosis. Client denies HA or dizziness. Denies CP or SOB. O2 3 lit NC. Denies abdominal pain, N/V, diarrhea, or constipation. Denies dysuria. Staff report client has a decreased appetite. Seng denies current pain.       Review of Systems ROS as described in in HPI     Physical Exam  Constitutional:       Comments: Sitting in Broda Chair    HENT:      Mouth/Throat:      Mouth: Mucous membranes are moist.   Cardiovascular:      Rate and Rhythm: Normal rate. Rhythm irregular.   Pulmonary:      Effort: Pulmonary effort is normal.      Breath sounds: Normal breath sounds.      Comments: 3 lit NC  Abdominal:      General: Bowel sounds are normal.   Musculoskeletal:      Cervical back: Neck supple.      Comments: Generalized weakness   No leg edema   Skin:     General: Skin is warm and dry.   Neurological:      Mental Status: She is alert. Mental status is at baseline.   Psychiatric:      Comments: No current heightened anxiety                    Electronically Signed By: VIKKI Echols   10/11/23  9:13 AM

## 2023-10-11 VITALS
SYSTOLIC BLOOD PRESSURE: 126 MMHG | OXYGEN SATURATION: 98 % | TEMPERATURE: 98 F | DIASTOLIC BLOOD PRESSURE: 78 MMHG | WEIGHT: 123.8 LBS | BODY MASS INDEX: 20.02 KG/M2 | RESPIRATION RATE: 17 BRPM | HEART RATE: 80 BPM

## 2023-10-11 PROBLEM — Z51.5 HOSPICE CARE PATIENT: Status: ACTIVE | Noted: 2023-10-11

## 2023-10-11 PROBLEM — F41.9 ANXIETY: Status: ACTIVE | Noted: 2023-10-11

## 2023-10-11 PROBLEM — R52 PAIN: Status: ACTIVE | Noted: 2023-10-11

## 2023-10-11 NOTE — PROGRESS NOTES
Patient ID: Marcelle Petit is a 89 y.o. female who is long term resident being seen and evaluated for multiple medical problems.  [id number]   7/6/1926    /78   Pulse 80   Temp 36.7 °C (98 °F)   Resp 17   Wt 56.2 kg (123 lb 12.8 oz)   SpO2 98%   BMI 20.02 kg/m²     Assessment/Plan  Problem List Items Addressed This Visit       Primary hypertension     Losartan 50 mg by mouth daily   Cardizem  mg by mouth daily  ASA 81 mg by mouth daily   H/O PAF         Pain     Morphine 5 mg  by mouth every 4 hrs as needed         Anxiety     Ativan 0.5 mg by mouth every 6 hrs as needed          Hospice care patient - Primary     [facility]   Cerebral atherosclerosis               HPI: Client now receiving [facility] services, Cerebral arthrosclerosis. Client denies HA or dizziness. Denies CP or SOB. O2 3 lit NC. Denies abdominal pain, N/V, diarrhea, or constipation. Denies dysuria. Staff report client has a decreased appetite. Seng denies current pain.       Review of Systems ROS as described in in HPI     Physical Exam  Constitutional:       Comments: Sitting in Broda Chair    HENT:      Mouth/Throat:      Mouth: Mucous membranes are moist.   Cardiovascular:      Rate and Rhythm: Normal rate. Rhythm irregular.   Pulmonary:      Effort: Pulmonary effort is normal.      Breath sounds: Normal breath sounds.      Comments: 3 lit NC  Abdominal:      General: Bowel sounds are normal.   Musculoskeletal:      Cervical back: Neck supple.      Comments: Generalized weakness   No leg edema   Skin:     General: Skin is warm and dry.   Neurological:      Mental Status: She is alert. Mental status is at baseline.   Psychiatric:      Comments: No current heightened anxiety

## 2023-11-21 ENCOUNTER — NURSING HOME VISIT (OUTPATIENT)
Dept: POST ACUTE CARE | Facility: EXTERNAL LOCATION | Age: 88
End: 2023-11-21
Payer: MEDICARE

## 2023-11-21 DIAGNOSIS — Z51.5 HOSPICE CARE PATIENT: Primary | ICD-10-CM

## 2023-11-21 DIAGNOSIS — R52 PAIN: ICD-10-CM

## 2023-11-21 DIAGNOSIS — I10 PRIMARY HYPERTENSION: ICD-10-CM

## 2023-11-21 PROCEDURE — 99308 SBSQ NF CARE LOW MDM 20: CPT | Performed by: NURSE PRACTITIONER

## 2023-11-21 NOTE — LETTER
Patient: Marcelle Pteit  : 1926    Encounter Date: 2023    Patient ID: Marcelle Petit is a 89 y.o. female who is long term resident being seen and evaluated for multiple medical problems.  [id number]   1926    /78   Pulse 80   Temp 36.1 °C (97 °F)   Resp 16   SpO2 98%     Assessment/Plan  Problem List Items Addressed This Visit       Primary hypertension     Losartan 50 mg by mouth daily   Cardizem  mg by mouth daily  ASA 81 mg by mouth daily   H/O PAF         Pain     Morphine 5 mg  by mouth every 4 hrs as needed          Hospice care patient - Primary     [facility]   Cerebral atherosclerosis               HPI: Client continues to receive [facility] services. Client [place]. Client denies HA or dizziness. Denies CP or SOB. Denies abdominal pain or N/V. Denies dysuria. Denies change in appetite. No current C/O pain or signs of heightened anxiety.      Review of Systems Poor historian, completed with staff assist     Physical Exam  Constitutional:       Comments: Resting in bed    HENT:      Head: Normocephalic.      Mouth/Throat:      Mouth: Mucous membranes are moist.   Cardiovascular:      Rate and Rhythm: Normal rate.      Comments: PAF  Pulmonary:      Effort: Pulmonary effort is normal.      Comments: 2 lit NC  Abdominal:      General: Bowel sounds are normal.   Musculoskeletal:      Cervical back: Neck supple.      Comments: WC transfers   Skin:     General: Skin is warm and dry.   Neurological:      Mental Status: She is alert. Mental status is at baseline.   Psychiatric:         Mood and Affect: Mood normal.      Comments: No current heightened anxiety                    Electronically Signed By: VIKKI Echols   23  9:34 AM

## 2023-11-26 VITALS
SYSTOLIC BLOOD PRESSURE: 126 MMHG | TEMPERATURE: 97 F | DIASTOLIC BLOOD PRESSURE: 78 MMHG | OXYGEN SATURATION: 98 % | HEART RATE: 80 BPM | RESPIRATION RATE: 16 BRPM

## 2023-11-26 NOTE — PROGRESS NOTES
Patient ID: Marcelle Petit is a 89 y.o. female who is long term resident being seen and evaluated for multiple medical problems.  [id number]   7/6/1926    /78   Pulse 80   Temp 36.1 °C (97 °F)   Resp 16   SpO2 98%     Assessment/Plan  Problem List Items Addressed This Visit       Primary hypertension     Losartan 50 mg by mouth daily   Cardizem  mg by mouth daily  ASA 81 mg by mouth daily   H/O PAF         Pain     Morphine 5 mg  by mouth every 4 hrs as needed          Hospice care patient - Primary     [facility]   Cerebral atherosclerosis               HPI: Client continues to receive [facility] services. Client [place]. Client denies HA or dizziness. Denies CP or SOB. Denies abdominal pain or N/V. Denies dysuria. Denies change in appetite. No current C/O pain or signs of heightened anxiety.      Review of Systems Poor historian, completed with staff assist     Physical Exam  Constitutional:       Comments: Resting in bed    HENT:      Head: Normocephalic.      Mouth/Throat:      Mouth: Mucous membranes are moist.   Cardiovascular:      Rate and Rhythm: Normal rate.      Comments: PAF  Pulmonary:      Effort: Pulmonary effort is normal.      Comments: 2 lit NC  Abdominal:      General: Bowel sounds are normal.   Musculoskeletal:      Cervical back: Neck supple.      Comments: WC transfers   Skin:     General: Skin is warm and dry.   Neurological:      Mental Status: She is alert. Mental status is at baseline.   Psychiatric:         Mood and Affect: Mood normal.      Comments: No current heightened anxiety

## 2023-12-28 ENCOUNTER — NURSING HOME VISIT (OUTPATIENT)
Dept: POST ACUTE CARE | Facility: EXTERNAL LOCATION | Age: 88
End: 2023-12-28
Payer: MEDICARE

## 2023-12-28 DIAGNOSIS — Z51.5 HOSPICE CARE PATIENT: Primary | ICD-10-CM

## 2023-12-28 DIAGNOSIS — R52 PAIN: ICD-10-CM

## 2023-12-28 DIAGNOSIS — F32.0 CURRENT MILD EPISODE OF MAJOR DEPRESSIVE DISORDER, UNSPECIFIED WHETHER RECURRENT (CMS-HCC): ICD-10-CM

## 2023-12-28 DIAGNOSIS — I10 PRIMARY HYPERTENSION: ICD-10-CM

## 2023-12-28 PROCEDURE — 99308 SBSQ NF CARE LOW MDM 20: CPT | Performed by: NURSE PRACTITIONER

## 2023-12-28 NOTE — LETTER
Patient: Marcelle Petit  : 1926    Encounter Date: 2023    Patient ID: Marcelle Petit is a 89 y.o. female who is long term resident being seen and evaluated for multiple medical problems.  [id number]   1926    /78   Pulse 80   Temp 36.7 °C (98 °F)   Resp 16   Wt 58.1 kg (128 lb)   SpO2 98%   BMI 20.69 kg/m²     Assessment/Plan  Problem List Items Addressed This Visit       Primary hypertension     Losartan 50 mg by mouth daily   Cardizem  mg by mouth daily  ASA 81 mg by mouth daily   H/O PAF         Depression     Mirtazapine 15 mg by mouth daily            Pain     Morphine 5 mg  by mouth every 4 hrs as needed           Hospice care patient - Primary     [facility]   Cerebral atherosclerosis               HPI: Client denies HA, dizziness, or lightheadedness. Denies CP, SOB, or increased edema. O2 2 lit NC. Denies abdominal pain, N/V, diarrhea, or constipation. Denies dysuria. Denies change in appetite. Denies current pain.      Review of Systems Limited ROS, poor historian     Physical Exam  Constitutional:       Comments: Resting in bed    HENT:      Mouth/Throat:      Mouth: Mucous membranes are moist.   Cardiovascular:      Rate and Rhythm: Normal rate.      Comments: PAF  Pulmonary:      Effort: Pulmonary effort is normal.      Breath sounds: Normal breath sounds.      Comments: 2 lit NC  Abdominal:      General: Bowel sounds are normal.   Musculoskeletal:      Cervical back: Neck supple.      Comments: No leg edema    Skin:     General: Skin is warm and dry.   Neurological:      Mental Status: She is alert. Mental status is at baseline.   Psychiatric:         Mood and Affect: Mood normal.                   Electronically Signed By: VIKKI Echols   23  7:02 AM

## 2023-12-29 VITALS
BODY MASS INDEX: 20.69 KG/M2 | TEMPERATURE: 98 F | SYSTOLIC BLOOD PRESSURE: 126 MMHG | WEIGHT: 128 LBS | RESPIRATION RATE: 16 BRPM | DIASTOLIC BLOOD PRESSURE: 78 MMHG | HEART RATE: 80 BPM | OXYGEN SATURATION: 98 %

## 2023-12-29 NOTE — PROGRESS NOTES
Patient ID: Marcelle Petit is a 89 y.o. female who is long term resident being seen and evaluated for multiple medical problems.  [id number]   7/6/1926    /78   Pulse 80   Temp 36.7 °C (98 °F)   Resp 16   Wt 58.1 kg (128 lb)   SpO2 98%   BMI 20.69 kg/m²     Assessment/Plan  Problem List Items Addressed This Visit       Primary hypertension     Losartan 50 mg by mouth daily   Cardizem  mg by mouth daily  ASA 81 mg by mouth daily   H/O PAF         Depression     Mirtazapine 15 mg by mouth daily            Pain     Morphine 5 mg  by mouth every 4 hrs as needed           Hospice care patient - Primary     [facility]   Cerebral atherosclerosis               HPI: Client denies HA, dizziness, or lightheadedness. Denies CP, SOB, or increased edema. O2 2 lit NC. Denies abdominal pain, N/V, diarrhea, or constipation. Denies dysuria. Denies change in appetite. Denies current pain.      Review of Systems Limited ROS, poor historian     Physical Exam  Constitutional:       Comments: Resting in bed    HENT:      Mouth/Throat:      Mouth: Mucous membranes are moist.   Cardiovascular:      Rate and Rhythm: Normal rate.      Comments: PAF  Pulmonary:      Effort: Pulmonary effort is normal.      Breath sounds: Normal breath sounds.      Comments: 2 lit NC  Abdominal:      General: Bowel sounds are normal.   Musculoskeletal:      Cervical back: Neck supple.      Comments: No leg edema    Skin:     General: Skin is warm and dry.   Neurological:      Mental Status: She is alert. Mental status is at baseline.   Psychiatric:         Mood and Affect: Mood normal.

## 2024-01-30 ENCOUNTER — NURSING HOME VISIT (OUTPATIENT)
Dept: POST ACUTE CARE | Facility: EXTERNAL LOCATION | Age: 89
End: 2024-01-30
Payer: MEDICARE

## 2024-01-30 DIAGNOSIS — I10 PRIMARY HYPERTENSION: ICD-10-CM

## 2024-01-30 DIAGNOSIS — F32.A DEPRESSION, UNSPECIFIED DEPRESSION TYPE: ICD-10-CM

## 2024-01-30 DIAGNOSIS — Z51.5 HOSPICE CARE PATIENT: Primary | ICD-10-CM

## 2024-01-30 DIAGNOSIS — R52 PAIN: ICD-10-CM

## 2024-01-30 PROCEDURE — 99308 SBSQ NF CARE LOW MDM 20: CPT | Performed by: NURSE PRACTITIONER

## 2024-01-30 NOTE — LETTER
Patient: Marcelle Petit  : 1926    Encounter Date: 2024    Patient ID: Marcelle Petit is a 89 y.o. female who is long term resident being seen and evaluated for multiple medical problems.  [id number]   1926    /78   Pulse 80   Temp 36.7 °C (98 °F)   Resp 16   Wt 60.6 kg (133 lb 9.6 oz)   SpO2 98%   BMI 21.60 kg/m²     Assessment/Plan  Problem List Items Addressed This Visit       Primary hypertension     Losartan 50 mg by mouth daily   Cardizem  mg by mouth daily  H/O PAF         Depression     Mirtazapine 15 mg by mouth daily             Pain     Morphine 5 mg  by mouth every 4 hrs as needed            Hospice care patient - Primary     [facility]   Cerebral atherosclerosis              HPI: Client continues to receive [facility] services. Client denies HA, dizziness, or lightheadedness. Denies CP, SOB, or increased edema. O2 2 lit NC. Denies abdominal pain, N/V, diarrhea, or constipation. Denies dysuria. Denies change in appetite. She denies current pain.     Review of Systems Limited ROS, Poor historian     Physical Exam  Constitutional:       Comments: Resting in bed    HENT:      Mouth/Throat:      Mouth: Mucous membranes are moist.   Cardiovascular:      Rate and Rhythm: Normal rate.      Comments: PAF  Pulmonary:      Effort: Pulmonary effort is normal.      Comments: 2 lit NC  Abdominal:      General: Bowel sounds are normal.   Genitourinary:     Comments: Denies dysuria   Musculoskeletal:      Cervical back: Neck supple.      Comments: No leg edema    Skin:     General: Skin is warm and dry.   Neurological:      Mental Status: She is alert. Mental status is at baseline.   Psychiatric:         Mood and Affect: Mood normal.      Comments: Appears comfortable  Follows direction                    Electronically Signed By: VIKKI Echols   24  3:23 PM

## 2024-02-02 VITALS
SYSTOLIC BLOOD PRESSURE: 126 MMHG | OXYGEN SATURATION: 98 % | RESPIRATION RATE: 16 BRPM | HEART RATE: 80 BPM | BODY MASS INDEX: 21.6 KG/M2 | WEIGHT: 133.6 LBS | TEMPERATURE: 98 F | DIASTOLIC BLOOD PRESSURE: 78 MMHG

## 2024-02-02 PROBLEM — R30.0 DYSURIA: Status: RESOLVED | Noted: 2023-08-30 | Resolved: 2024-02-02

## 2024-02-02 PROBLEM — J18.9 PNEUMONIA OF BOTH LUNGS DUE TO INFECTIOUS ORGANISM: Status: RESOLVED | Noted: 2023-09-27 | Resolved: 2024-02-02

## 2024-02-02 NOTE — PROGRESS NOTES
Patient ID: Marcelle Petit is a 89 y.o. female who is long term resident being seen and evaluated for multiple medical problems.  [id number]   7/6/1926    /78   Pulse 80   Temp 36.7 °C (98 °F)   Resp 16   Wt 60.6 kg (133 lb 9.6 oz)   SpO2 98%   BMI 21.60 kg/m²     Assessment/Plan  Problem List Items Addressed This Visit       Primary hypertension     Losartan 50 mg by mouth daily   Cardizem  mg by mouth daily  H/O PAF         Depression     Mirtazapine 15 mg by mouth daily             Pain     Morphine 5 mg  by mouth every 4 hrs as needed            Hospice care patient - Primary     [facility]   Cerebral atherosclerosis              HPI: Client continues to receive [facility] services. Client denies HA, dizziness, or lightheadedness. Denies CP, SOB, or increased edema. O2 2 lit NC. Denies abdominal pain, N/V, diarrhea, or constipation. Denies dysuria. Denies change in appetite. She denies current pain.     Review of Systems Limited ROS, Poor historian     Physical Exam  Constitutional:       Comments: Resting in bed    HENT:      Mouth/Throat:      Mouth: Mucous membranes are moist.   Cardiovascular:      Rate and Rhythm: Normal rate.      Comments: PAF  Pulmonary:      Effort: Pulmonary effort is normal.      Comments: 2 lit NC  Abdominal:      General: Bowel sounds are normal.   Genitourinary:     Comments: Denies dysuria   Musculoskeletal:      Cervical back: Neck supple.      Comments: No leg edema    Skin:     General: Skin is warm and dry.   Neurological:      Mental Status: She is alert. Mental status is at baseline.   Psychiatric:         Mood and Affect: Mood normal.      Comments: Appears comfortable  Follows direction

## 2024-03-05 ENCOUNTER — NURSING HOME VISIT (OUTPATIENT)
Dept: POST ACUTE CARE | Facility: EXTERNAL LOCATION | Age: 89
End: 2024-03-05
Payer: MEDICARE

## 2024-03-05 DIAGNOSIS — F32.0 CURRENT MILD EPISODE OF MAJOR DEPRESSIVE DISORDER, UNSPECIFIED WHETHER RECURRENT (CMS-HCC): ICD-10-CM

## 2024-03-05 DIAGNOSIS — I10 PRIMARY HYPERTENSION: ICD-10-CM

## 2024-03-05 DIAGNOSIS — Z51.5 HOSPICE CARE PATIENT: Primary | ICD-10-CM

## 2024-03-05 DIAGNOSIS — R52 PAIN: ICD-10-CM

## 2024-03-05 PROCEDURE — 99308 SBSQ NF CARE LOW MDM 20: CPT | Performed by: NURSE PRACTITIONER

## 2024-03-05 NOTE — LETTER
Patient: Marcelle Petit  : 1926    Encounter Date: 2024    Patient ID: Marcelle Petit is a 89 y.o. female who is long term resident being seen and evaluated for multiple medical problems.  [id number]   1926    /78   Pulse 80   Temp 36.8 °C (98.2 °F)   Resp 16   Wt 61.7 kg (136 lb)   SpO2 98%   BMI 21.99 kg/m²     Assessment/Plan  Problem List Items Addressed This Visit       Primary hypertension     Losartan 50 mg by mouth daily   Cardizem  mg by mouth daily  H/O PAF         Depression     Mirtazapine 15 mg by mouth daily            Pain     Morphine 5 mg  by mouth every 4 hrs as needed            Hospice care patient - Primary     [facility]   Cerebral atherosclerosis          HPI: Client continues to receive [facility] services. ROS completed with staff assist. No reported CP, SOB, or cough. O2 2 lit NC. No abdominal pain or N/V. No urinary concerns. She has no current signs of pain, heightened anxiety, or tachypnea.     Review of Systems Completed with staff assist    Physical Exam  Constitutional:       Comments: Sitting in Broda Chair    HENT:      Mouth/Throat:      Mouth: Mucous membranes are moist.   Cardiovascular:      Rate and Rhythm: Normal rate.   Pulmonary:      Effort: Pulmonary effort is normal.      Comments: 2 lit NC  Abdominal:      General: Bowel sounds are normal.   Skin:     General: Skin is warm and dry.   Neurological:      Mental Status: She is alert.   Psychiatric:      Comments: No signs heightened anxiety                    Electronically Signed By: VIKKI Echols   3/6/24  6:55 AM

## 2024-03-06 VITALS
TEMPERATURE: 98.2 F | SYSTOLIC BLOOD PRESSURE: 126 MMHG | RESPIRATION RATE: 16 BRPM | BODY MASS INDEX: 21.99 KG/M2 | DIASTOLIC BLOOD PRESSURE: 78 MMHG | WEIGHT: 136 LBS | HEART RATE: 80 BPM | OXYGEN SATURATION: 98 %

## 2024-03-06 NOTE — PROGRESS NOTES
Patient ID: Marcelle Petit is a 89 y.o. female who is long term resident being seen and evaluated for multiple medical problems.  [id number]   7/6/1926    /78   Pulse 80   Temp 36.8 °C (98.2 °F)   Resp 16   Wt 61.7 kg (136 lb)   SpO2 98%   BMI 21.99 kg/m²     Assessment/Plan  Problem List Items Addressed This Visit       Primary hypertension     Losartan 50 mg by mouth daily   Cardizem  mg by mouth daily  H/O PAF         Depression     Mirtazapine 15 mg by mouth daily            Pain     Morphine 5 mg  by mouth every 4 hrs as needed            Hospice care patient - Primary     [facility]   Cerebral atherosclerosis          HPI: Client continues to receive [facility] services. ROS completed with staff assist. No reported CP, SOB, or cough. O2 2 lit NC. No abdominal pain or N/V. No urinary concerns. She has no current signs of pain, heightened anxiety, or tachypnea.     Review of Systems Completed with staff assist    Physical Exam  Constitutional:       Comments: Sitting in Broda Chair    HENT:      Mouth/Throat:      Mouth: Mucous membranes are moist.   Cardiovascular:      Rate and Rhythm: Normal rate.   Pulmonary:      Effort: Pulmonary effort is normal.      Comments: 2 lit NC  Abdominal:      General: Bowel sounds are normal.   Skin:     General: Skin is warm and dry.   Neurological:      Mental Status: She is alert.   Psychiatric:      Comments: No signs heightened anxiety

## 2024-04-16 ENCOUNTER — NURSING HOME VISIT (OUTPATIENT)
Dept: POST ACUTE CARE | Facility: EXTERNAL LOCATION | Age: 89
End: 2024-04-16
Payer: MEDICARE

## 2024-04-16 DIAGNOSIS — R52 PAIN: ICD-10-CM

## 2024-04-16 DIAGNOSIS — F41.9 ANXIETY: ICD-10-CM

## 2024-04-16 DIAGNOSIS — Z51.5 HOSPICE CARE PATIENT: Primary | ICD-10-CM

## 2024-04-16 DIAGNOSIS — I10 PRIMARY HYPERTENSION: ICD-10-CM

## 2024-04-16 PROCEDURE — 99308 SBSQ NF CARE LOW MDM 20: CPT | Performed by: NURSE PRACTITIONER

## 2024-04-16 NOTE — LETTER
Patient: Marcelle Petit  : 1926    Encounter Date: 2024    Patient ID: Marcelle Petit is a 89 y.o. female who is long term resident being seen and evaluated for multiple medical problems.  [id number]   1926    Pulse 75   Resp 16   Wt 61.7 kg (136 lb)   BMI 21.99 kg/m²     Assessment/Plan  Problem List Items Addressed This Visit       Primary hypertension     Losartan 50 mg by mouth daily   Cardizem  mg by mouth daily  H/O PAF         Pain     Morphine 5 mg  by mouth/ SL every 4 hrs as needed            Anxiety     Ativan 0.5 mg by mouth every 6 hrs as needed          Hospice care patient - Primary     [facility]   Cerebral atherosclerosis              HPI: Client continues to receive [facility] services. Client denies HA or dizziness,. Denies CP or SOB. O2 2 lit NC intact. Denies abdominal pain or N/V. Denies dysuria. Denies change in appetite. She has no current C/O pain. She has no current sign of heightened anxiety or tachypnea.       Review of Systems ROS as described in HPI     Physical Exam  Constitutional:       Comments: Sitting in custom WC   HENT:      Mouth/Throat:      Mouth: Mucous membranes are moist.   Cardiovascular:      Rate and Rhythm: Normal rate.   Pulmonary:      Effort: Pulmonary effort is normal.      Breath sounds: Normal breath sounds.      Comments: 2 lit NC  Abdominal:      General: Bowel sounds are normal.   Skin:     General: Skin is warm and dry.   Neurological:      Mental Status: She is alert. Mental status is at baseline.   Psychiatric:         Mood and Affect: Mood normal.      Comments: Cooperative                    Electronically Signed By: VIKKI Echols   24 11:23 AM

## 2024-04-17 VITALS — RESPIRATION RATE: 16 BRPM | WEIGHT: 136 LBS | HEART RATE: 75 BPM | BODY MASS INDEX: 21.99 KG/M2

## 2024-04-17 NOTE — PROGRESS NOTES
Patient ID: Marcelle Petit is a 89 y.o. female who is long term resident being seen and evaluated for multiple medical problems.  [id number]   7/6/1926    Pulse 75   Resp 16   Wt 61.7 kg (136 lb)   BMI 21.99 kg/m²     Assessment/Plan  Problem List Items Addressed This Visit       Primary hypertension     Losartan 50 mg by mouth daily   Cardizem  mg by mouth daily  H/O PAF         Pain     Morphine 5 mg  by mouth/ SL every 4 hrs as needed            Anxiety     Ativan 0.5 mg by mouth every 6 hrs as needed          Hospice care patient - Primary     [facility]   Cerebral atherosclerosis              HPI: Client continues to receive [facility] services. Client denies HA or dizziness,. Denies CP or SOB. O2 2 lit NC intact. Denies abdominal pain or N/V. Denies dysuria. Denies change in appetite. She has no current C/O pain. She has no current sign of heightened anxiety or tachypnea.       Review of Systems ROS as described in HPI     Physical Exam  Constitutional:       Comments: Sitting in custom WC   HENT:      Mouth/Throat:      Mouth: Mucous membranes are moist.   Cardiovascular:      Rate and Rhythm: Normal rate.   Pulmonary:      Effort: Pulmonary effort is normal.      Breath sounds: Normal breath sounds.      Comments: 2 lit NC  Abdominal:      General: Bowel sounds are normal.   Skin:     General: Skin is warm and dry.   Neurological:      Mental Status: She is alert. Mental status is at baseline.   Psychiatric:         Mood and Affect: Mood normal.      Comments: Cooperative

## 2024-05-21 ENCOUNTER — NURSING HOME VISIT (OUTPATIENT)
Dept: POST ACUTE CARE | Facility: EXTERNAL LOCATION | Age: 89
End: 2024-05-21
Payer: MEDICARE

## 2024-05-21 DIAGNOSIS — I10 PRIMARY HYPERTENSION: ICD-10-CM

## 2024-05-21 DIAGNOSIS — R52 PAIN: ICD-10-CM

## 2024-05-21 DIAGNOSIS — Z51.5 HOSPICE CARE PATIENT: Primary | ICD-10-CM

## 2024-05-21 DIAGNOSIS — F32.0 CURRENT MILD EPISODE OF MAJOR DEPRESSIVE DISORDER, UNSPECIFIED WHETHER RECURRENT (CMS-HCC): ICD-10-CM

## 2024-05-21 PROCEDURE — 99308 SBSQ NF CARE LOW MDM 20: CPT | Performed by: NURSE PRACTITIONER

## 2024-05-21 NOTE — Clinical Note
Patient: Marcelle Petit  : 1926    Encounter Date: 2024    Patient ID: Marcelle Petit is a 89 y.o. female who is long term resident being seen and evaluated for multiple medical problems.  [id number]   1926    /78   Pulse 80   Temp 36.2 °C (97.2 °F)   Resp 16   Wt 63.5 kg (140 lb)   SpO2 98%   BMI 22.63 kg/m²     Assessment/Plan  Problem List Items Addressed This Visit       Primary hypertension     Losartan 50 mg by mouth daily   Cardizem  mg by mouth daily  H/O PAF         Depression     Mirtazapine 15 mg by mouth daily            Pain     Morphine 5 mg  by mouth/ SL every 4 hrs as needed     Acetaminophen 650 mg by mouth every 6 hrs as needed          Hospice care patient - Primary     [facility]   Cerebral atherosclerosis              HPI: Client continues to receive [facility] services. Client denies HA, dizziness, or lightheadedness. Denies CP or SOB, O2 2 lit NC. Denies abdominal pain or N/V. Denies dysuria. She denies current pain.     Review of Systems Limited ROS- poor historian, Completed with staff input    Physical Exam  Constitutional:       Comments: Sitting in bed    HENT:      Mouth/Throat:      Mouth: Mucous membranes are moist.   Cardiovascular:      Rate and Rhythm: Normal rate.   Pulmonary:      Effort: Pulmonary effort is normal.      Comments: 2 lit NC  No tachypnea   Abdominal:      General: Bowel sounds are normal.   Musculoskeletal:      Comments: No significant leg edema    Skin:     General: Skin is warm and dry.   Neurological:      Mental Status: She is alert. Mental status is at baseline.   Psychiatric:         Mood and Affect: Mood normal.      Comments: No current signs of pain or heightened anxiety                    Electronically Signed By: VIKKI Echols   24  9:23 AM

## 2024-05-22 VITALS
RESPIRATION RATE: 16 BRPM | OXYGEN SATURATION: 98 % | HEART RATE: 80 BPM | SYSTOLIC BLOOD PRESSURE: 126 MMHG | TEMPERATURE: 97.2 F | BODY MASS INDEX: 22.63 KG/M2 | WEIGHT: 140 LBS | DIASTOLIC BLOOD PRESSURE: 78 MMHG

## 2024-05-22 NOTE — PROGRESS NOTES
Patient ID: Marcelle Petit is a 89 y.o. female who is long term resident being seen and evaluated for multiple medical problems.  [id number]   7/6/1926    /78   Pulse 80   Temp 36.2 °C (97.2 °F)   Resp 16   Wt 63.5 kg (140 lb)   SpO2 98%   BMI 22.63 kg/m²     Assessment/Plan  Problem List Items Addressed This Visit       Primary hypertension     Losartan 50 mg by mouth daily   Cardizem  mg by mouth daily  H/O PAF         Depression     Mirtazapine 15 mg by mouth daily            Pain     Morphine 5 mg  by mouth/ SL every 4 hrs as needed     Acetaminophen 650 mg by mouth every 6 hrs as needed          Hospice care patient - Primary     [facility]   Cerebral atherosclerosis              HPI: Client continues to receive [facility] services. Client denies HA, dizziness, or lightheadedness. Denies CP or SOB, O2 2 lit NC. Denies abdominal pain or N/V. Denies dysuria. She denies current pain.     Review of Systems Limited ROS- poor historian, Completed with staff input    Physical Exam  Constitutional:       Comments: Sitting in bed    HENT:      Mouth/Throat:      Mouth: Mucous membranes are moist.   Cardiovascular:      Rate and Rhythm: Normal rate.   Pulmonary:      Effort: Pulmonary effort is normal.      Comments: 2 lit NC  No tachypnea   Abdominal:      General: Bowel sounds are normal.   Musculoskeletal:      Comments: No significant leg edema    Skin:     General: Skin is warm and dry.   Neurological:      Mental Status: She is alert. Mental status is at baseline.   Psychiatric:         Mood and Affect: Mood normal.      Comments: No current signs of pain or heightened anxiety

## 2024-05-22 NOTE — ASSESSMENT & PLAN NOTE
Morphine 5 mg  by mouth/ SL every 4 hrs as needed     Acetaminophen 650 mg by mouth every 6 hrs as needed

## 2024-06-18 ENCOUNTER — NURSING HOME VISIT (OUTPATIENT)
Dept: POST ACUTE CARE | Facility: EXTERNAL LOCATION | Age: 89
End: 2024-06-18
Payer: MEDICARE

## 2024-06-18 DIAGNOSIS — Z51.5 HOSPICE CARE PATIENT: Primary | ICD-10-CM

## 2024-06-18 DIAGNOSIS — R30.0 DYSURIA: ICD-10-CM

## 2024-06-18 DIAGNOSIS — R52 PAIN: ICD-10-CM

## 2024-06-18 PROCEDURE — 99308 SBSQ NF CARE LOW MDM 20: CPT | Performed by: NURSE PRACTITIONER

## 2024-06-18 NOTE — LETTER
Patient: Marcelle Petit  : 1926    Encounter Date: 2024    Patient ID: Marcelle Petit is a 89 y.o. female who is long term resident being seen and evaluated for multiple medical problems.  [id number]   1926    Pulse 85   Resp 16   Wt 61.2 kg (135 lb)   BMI 21.83 kg/m²     Assessment/Plan  Problem List Items Addressed This Visit       Dysuria     Dysuria/ SP pain   Augmentin 875/125 mg by mouth BID x 7 days            Pain     Morphine 5 mg  by mouth/ SL every 4 hrs as needed     Acetaminophen 650 mg by mouth every 6 hrs as needed          Hospice care patient - Primary     [facility]   Cerebral atherosclerosis              HPI: Client continues to receive [facility] services. Staff report client has foul smelling urine. Client C/O SP pain with palpation. Client denies HA, or dizziness. No reported SOB or CP. O2 2 lit NC intact. No reported N/V or diarrhea. Appetite is decreased. No other current C/O pain.     Review of Systems Limited ROS, completed with staff input     Physical Exam  Constitutional:       Comments: Sitting in bed  Katie care representative with client    HENT:      Mouth/Throat:      Mouth: Mucous membranes are moist.   Cardiovascular:      Rate and Rhythm: Normal rate.   Pulmonary:      Effort: Pulmonary effort is normal.      Comments: 2 lit NC  Abdominal:      General: Bowel sounds are normal.   Genitourinary:     Comments: SP pain with palpation   Skin:     General: Skin is warm and dry.   Neurological:      Mental Status: She is alert. Mental status is at baseline.   Psychiatric:         Mood and Affect: Mood normal.      Comments: No current sign of heightened anxiety                    Electronically Signed By: VIKKI Echols   24 10:53 AM

## 2024-06-19 VITALS — BODY MASS INDEX: 21.83 KG/M2 | HEART RATE: 85 BPM | WEIGHT: 135 LBS | RESPIRATION RATE: 16 BRPM

## 2024-06-19 PROBLEM — M05.9 RHEUMATOID ARTHRITIS WITH RHEUMATOID FACTOR (MULTI): Status: RESOLVED | Noted: 2023-03-05 | Resolved: 2024-06-19

## 2024-06-19 NOTE — PROGRESS NOTES
Patient ID: Marcelle Petit is a 89 y.o. female who is long term resident being seen and evaluated for multiple medical problems.  [id number]   7/6/1926    Pulse 85   Resp 16   Wt 61.2 kg (135 lb)   BMI 21.83 kg/m²     Assessment/Plan  Problem List Items Addressed This Visit       Dysuria     Dysuria/ SP pain   Augmentin 875/125 mg by mouth BID x 7 days            Pain     Morphine 5 mg  by mouth/ SL every 4 hrs as needed     Acetaminophen 650 mg by mouth every 6 hrs as needed          Hospice care patient - Primary     [facility]   Cerebral atherosclerosis              HPI: Client continues to receive [facility] services. Staff report client has foul smelling urine. Client C/O SP pain with palpation. Client denies HA, or dizziness. No reported SOB or CP. O2 2 lit NC intact. No reported N/V or diarrhea. Appetite is decreased. No other current C/O pain.     Review of Systems Limited ROS, completed with staff input     Physical Exam  Constitutional:       Comments: Sitting in bed  Katie care representative with client    HENT:      Mouth/Throat:      Mouth: Mucous membranes are moist.   Cardiovascular:      Rate and Rhythm: Normal rate.   Pulmonary:      Effort: Pulmonary effort is normal.      Comments: 2 lit NC  Abdominal:      General: Bowel sounds are normal.   Genitourinary:     Comments: SP pain with palpation   Skin:     General: Skin is warm and dry.   Neurological:      Mental Status: She is alert. Mental status is at baseline.   Psychiatric:         Mood and Affect: Mood normal.      Comments: No current sign of heightened anxiety

## 2024-06-25 ENCOUNTER — NURSING HOME VISIT (OUTPATIENT)
Dept: POST ACUTE CARE | Facility: EXTERNAL LOCATION | Age: 89
End: 2024-06-25
Payer: MEDICARE

## 2024-06-25 VITALS — RESPIRATION RATE: 15 BRPM | WEIGHT: 135 LBS | TEMPERATURE: 98 F | BODY MASS INDEX: 21.83 KG/M2 | OXYGEN SATURATION: 96 %

## 2024-06-25 DIAGNOSIS — G30.1 ALZHEIMER'S DISEASE WITH LATE ONSET (CODE) (MULTI): ICD-10-CM

## 2024-06-25 DIAGNOSIS — R30.0 DYSURIA: ICD-10-CM

## 2024-06-25 DIAGNOSIS — Z51.5 HOSPICE CARE PATIENT: Primary | ICD-10-CM

## 2024-06-25 DIAGNOSIS — R52 PAIN: ICD-10-CM

## 2024-06-25 PROCEDURE — 99308 SBSQ NF CARE LOW MDM 20: CPT | Performed by: NURSE PRACTITIONER

## 2024-06-25 NOTE — LETTER
Patient: Marcelle Petit  : 1926    Encounter Date: 2024    Patient ID: Marcelle Petit is a 89 y.o. female who is long term resident being seen and evaluated for multiple medical problems.  [id number]   1926    Temp 36.7 °C (98 °F)   Resp 15   Wt 61.2 kg (135 lb)   SpO2 96%   BMI 21.83 kg/m²     Assessment/Plan  Problem List Items Addressed This Visit       Alzheimer's disease with late onset (CODE) (Multi)     A/O x1         Dysuria     Dysuria/ SP pain   Augmentin 875/125 mg by mouth BID x 7 days (Stop date 2024)   Afebrile          Pain     Morphine 5 mg  by mouth/ SL every 4 hrs as needed     Acetaminophen 650 mg by mouth every 6 hrs as needed          Hospice care patient - Primary     [facility]   Cerebral atherosclerosis              HPI: Client continues Augmentin for dysuria. She continues to receive Hospice services. ROS completed with staff input. No reported HA or dizziness. No CP or SOB. O2 2 lit NC. No C/O abdominal pain or N/V. She denies current SP pain. She has no current signs of heightened anxiety or pain.     Review of Systems Limited ROS    Physical Exam  Constitutional:       Comments: Sitting in bed    HENT:      Mouth/Throat:      Mouth: Mucous membranes are moist.   Cardiovascular:      Rate and Rhythm: Normal rate.   Pulmonary:      Effort: Pulmonary effort is normal.      Comments: O2 2 lit NC  Abdominal:      General: Bowel sounds are normal.   Genitourinary:     Comments: Denies current dysuria   Skin:     General: Skin is warm and dry.   Neurological:      Mental Status: She is alert. Mental status is at baseline.      Comments: A/O x1   Psychiatric:         Mood and Affect: Mood normal.                   Electronically Signed By: VIKKI Echols   24  7:41 PM

## 2024-06-25 NOTE — PROGRESS NOTES
Patient ID: Marcelle Petit is a 89 y.o. female who is long term resident being seen and evaluated for multiple medical problems.  [id number]   7/6/1926    Temp 36.7 °C (98 °F)   Resp 15   Wt 61.2 kg (135 lb)   SpO2 96%   BMI 21.83 kg/m²     Assessment/Plan  Problem List Items Addressed This Visit       Alzheimer's disease with late onset (CODE) (Multi)     A/O x1         Dysuria     Dysuria/ SP pain   Augmentin 875/125 mg by mouth BID x 7 days (Stop date 6/28/2024)   Afebrile          Pain     Morphine 5 mg  by mouth/ SL every 4 hrs as needed     Acetaminophen 650 mg by mouth every 6 hrs as needed          Hospice care patient - Primary     [facility]   Cerebral atherosclerosis              HPI: Client continues Augmentin for dysuria. She continues to receive Hospice services. ROS completed with staff input. No reported HA or dizziness. No CP or SOB. O2 2 lit NC. No C/O abdominal pain or N/V. She denies current SP pain. She has no current signs of heightened anxiety or pain.     Review of Systems Limited ROS    Physical Exam  Constitutional:       Comments: Sitting in bed    HENT:      Mouth/Throat:      Mouth: Mucous membranes are moist.   Cardiovascular:      Rate and Rhythm: Normal rate.   Pulmonary:      Effort: Pulmonary effort is normal.      Comments: O2 2 lit NC  Abdominal:      General: Bowel sounds are normal.   Genitourinary:     Comments: Denies current dysuria   Skin:     General: Skin is warm and dry.   Neurological:      Mental Status: She is alert. Mental status is at baseline.      Comments: A/O x1   Psychiatric:         Mood and Affect: Mood normal.

## 2024-07-30 ENCOUNTER — NURSING HOME VISIT (OUTPATIENT)
Dept: POST ACUTE CARE | Facility: EXTERNAL LOCATION | Age: 89
End: 2024-07-30
Payer: MEDICARE

## 2024-07-30 DIAGNOSIS — R52 PAIN: ICD-10-CM

## 2024-07-30 DIAGNOSIS — Z51.5 HOSPICE CARE PATIENT: Primary | ICD-10-CM

## 2024-07-30 DIAGNOSIS — F32.0 CURRENT MILD EPISODE OF MAJOR DEPRESSIVE DISORDER, UNSPECIFIED WHETHER RECURRENT (CMS-HCC): ICD-10-CM

## 2024-07-30 DIAGNOSIS — I10 PRIMARY HYPERTENSION: ICD-10-CM

## 2024-07-30 PROCEDURE — 99308 SBSQ NF CARE LOW MDM 20: CPT | Performed by: NURSE PRACTITIONER

## 2024-07-30 NOTE — LETTER
Patient: Marcelle Petit  : 1926    Encounter Date: 2024    Patient ID: Marcelle Petit is a 89 y.o. female who is long term resident being seen and evaluated for multiple medical problems.  [id number]   1926    /78   Pulse 80   Temp 36.6 °C (97.9 °F)   Resp 16   Wt 60.8 kg (134 lb)   SpO2 98%   BMI 21.66 kg/m²     Assessment/Plan  Problem List Items Addressed This Visit       Primary hypertension     Losartan 50 mg by mouth daily   Cardizem  mg by mouth daily  H/O PAF         Depression     Mirtazapine 15 mg by mouth daily            Pain     Morphine 5 mg  by mouth/ SL every 4 hrs as needed     Acetaminophen 650 mg by mouth every 6 hrs as needed         Hospice care patient - Primary     [facility]   Cerebral atherosclerosis              HPI: Client continues to receive [facility] services. ROS completed with staff input. No C/O CP or SOB. O2 2 lit NC. No abdominal pain or N/V. No significant weight change. She has no current signs of heightened anxiety or pain.     Review of Systems Limited ROS, completed with staff input    Physical Exam  Constitutional:       Comments: Sitting in bed   Appears comfortable    HENT:      Mouth/Throat:      Mouth: Mucous membranes are moist.   Cardiovascular:      Rate and Rhythm: Normal rate.   Pulmonary:      Effort: Pulmonary effort is normal.      Comments: 2 lit NC  Abdominal:      General: Bowel sounds are normal.   Musculoskeletal:      Comments: No leg edema    Skin:     General: Skin is warm and dry.   Neurological:      Mental Status: She is alert. Mental status is at baseline.      Comments: Limited verbalization at this time    Psychiatric:         Mood and Affect: Mood normal.                   Electronically Signed By: VIKKI Echols   24 11:35 AM

## 2024-08-01 VITALS
TEMPERATURE: 97.9 F | BODY MASS INDEX: 21.66 KG/M2 | RESPIRATION RATE: 16 BRPM | SYSTOLIC BLOOD PRESSURE: 129 MMHG | DIASTOLIC BLOOD PRESSURE: 78 MMHG | HEART RATE: 80 BPM | WEIGHT: 134 LBS | OXYGEN SATURATION: 98 %

## 2024-08-01 PROBLEM — I48.91 ATRIAL FIBRILLATION (MULTI): Status: RESOLVED | Noted: 2023-03-05 | Resolved: 2024-08-01

## 2024-08-01 NOTE — PROGRESS NOTES
Patient ID: Marcelle Petit is a 89 y.o. female who is long term resident being seen and evaluated for multiple medical problems.  [id number]   7/6/1926    /78   Pulse 80   Temp 36.6 °C (97.9 °F)   Resp 16   Wt 60.8 kg (134 lb)   SpO2 98%   BMI 21.66 kg/m²     Assessment/Plan  Problem List Items Addressed This Visit       Primary hypertension     Losartan 50 mg by mouth daily   Cardizem  mg by mouth daily  H/O PAF         Depression     Mirtazapine 15 mg by mouth daily            Pain     Morphine 5 mg  by mouth/ SL every 4 hrs as needed     Acetaminophen 650 mg by mouth every 6 hrs as needed         Hospice care patient - Primary     [facility]   Cerebral atherosclerosis              HPI: Client continues to receive [facility] services. ROS completed with staff input. No C/O CP or SOB. O2 2 lit NC. No abdominal pain or N/V. No significant weight change. She has no current signs of heightened anxiety or pain.     Review of Systems Limited ROS, completed with staff input    Physical Exam  Constitutional:       Comments: Sitting in bed   Appears comfortable    HENT:      Mouth/Throat:      Mouth: Mucous membranes are moist.   Cardiovascular:      Rate and Rhythm: Normal rate.   Pulmonary:      Effort: Pulmonary effort is normal.      Comments: 2 lit NC  Abdominal:      General: Bowel sounds are normal.   Musculoskeletal:      Comments: No leg edema    Skin:     General: Skin is warm and dry.   Neurological:      Mental Status: She is alert. Mental status is at baseline.      Comments: Limited verbalization at this time    Psychiatric:         Mood and Affect: Mood normal.

## 2024-09-10 ENCOUNTER — NURSING HOME VISIT (OUTPATIENT)
Dept: POST ACUTE CARE | Facility: EXTERNAL LOCATION | Age: 89
End: 2024-09-10
Payer: MEDICARE

## 2024-09-10 DIAGNOSIS — I10 PRIMARY HYPERTENSION: ICD-10-CM

## 2024-09-10 DIAGNOSIS — R52 PAIN: ICD-10-CM

## 2024-09-10 DIAGNOSIS — Z51.5 HOSPICE CARE PATIENT: Primary | ICD-10-CM

## 2024-09-10 PROCEDURE — 99308 SBSQ NF CARE LOW MDM 20: CPT | Performed by: NURSE PRACTITIONER

## 2024-09-10 NOTE — LETTER
Patient: Marcelle Petit  : 1926    Encounter Date: 09/10/2024    Patient ID: Marcelle Petit is a 89 y.o. female who is long term resident being seen and evaluated for multiple medical problems.  [id number]   1926    Pulse 88   Resp 16   SpO2 97%     Assessment/Plan  Problem List Items Addressed This Visit       Primary hypertension     Losartan 50 mg by mouth daily   Cardizem  mg by mouth daily  H/O PAF         Pain     Morphine 5 mg  by mouth/ SL every 4 hrs as needed     Acetaminophen 650 mg by mouth every 6 hrs as needed         Hospice care patient - Primary     [facility]   Cerebral atherosclerosis              : client continues to receive [facility] services. ROS completed with client and staff input. Client denies HA or dizziness.  Denies CP or SOB. O2 2 lit NC.  Denies abdominal pain or N/V.  Denies dysuria. No significant weight change. She has no current signs of pain or heightened anxiety. No tachypnea.      Review of Systems Limited participation in ROS. Completed with staff input     Physical Exam  Constitutional:       Comments: Sitting in bed    HENT:      Mouth/Throat:      Mouth: Mucous membranes are moist.   Cardiovascular:      Rate and Rhythm: Normal rate. Rhythm irregular.   Pulmonary:      Effort: Pulmonary effort is normal.      Breath sounds: Normal breath sounds.      Comments: 2 lit NC  Abdominal:      General: Bowel sounds are normal.   Musculoskeletal:      Comments: No leg edema    Skin:     General: Skin is warm and dry.   Neurological:      Mental Status: She is alert. Mental status is at baseline.   Psychiatric:         Mood and Affect: Mood normal.                   Electronically Signed By: VIKKI Echols   24  3:39 PM

## 2024-09-12 VITALS — RESPIRATION RATE: 16 BRPM | HEART RATE: 88 BPM | OXYGEN SATURATION: 97 %

## 2024-09-12 NOTE — PROGRESS NOTES
Patient ID: Marcelle Petit is a 89 y.o. female who is long term resident being seen and evaluated for multiple medical problems.  [id number]   7/6/1926    Pulse 88   Resp 16   SpO2 97%     Assessment/Plan  Problem List Items Addressed This Visit       Primary hypertension     Losartan 50 mg by mouth daily   Cardizem  mg by mouth daily  H/O PAF         Pain     Morphine 5 mg  by mouth/ SL every 4 hrs as needed     Acetaminophen 650 mg by mouth every 6 hrs as needed         Hospice care patient - Primary     [facility]   Cerebral atherosclerosis              : client continues to receive [facility] services. ROS completed with client and staff input. Client denies HA or dizziness.  Denies CP or SOB. O2 2 lit NC.  Denies abdominal pain or N/V.  Denies dysuria. No significant weight change. She has no current signs of pain or heightened anxiety. No tachypnea.      Review of Systems Limited participation in ROS. Completed with staff input     Physical Exam  Constitutional:       Comments: Sitting in bed    HENT:      Mouth/Throat:      Mouth: Mucous membranes are moist.   Cardiovascular:      Rate and Rhythm: Normal rate. Rhythm irregular.   Pulmonary:      Effort: Pulmonary effort is normal.      Breath sounds: Normal breath sounds.      Comments: 2 lit NC  Abdominal:      General: Bowel sounds are normal.   Musculoskeletal:      Comments: No leg edema    Skin:     General: Skin is warm and dry.   Neurological:      Mental Status: She is alert. Mental status is at baseline.   Psychiatric:         Mood and Affect: Mood normal.

## 2024-10-29 ENCOUNTER — NURSING HOME VISIT (OUTPATIENT)
Dept: POST ACUTE CARE | Facility: EXTERNAL LOCATION | Age: 89
End: 2024-10-29
Payer: MEDICARE

## 2024-10-29 DIAGNOSIS — I10 PRIMARY HYPERTENSION: ICD-10-CM

## 2024-10-29 DIAGNOSIS — Z51.5 HOSPICE CARE PATIENT: Primary | ICD-10-CM

## 2024-10-29 DIAGNOSIS — R52 PAIN: ICD-10-CM

## 2024-10-30 VITALS — RESPIRATION RATE: 16 BRPM | WEIGHT: 130.2 LBS | HEART RATE: 80 BPM | BODY MASS INDEX: 21.05 KG/M2

## 2024-12-10 ENCOUNTER — NURSING HOME VISIT (OUTPATIENT)
Dept: POST ACUTE CARE | Facility: EXTERNAL LOCATION | Age: 89
End: 2024-12-10
Payer: MEDICARE

## 2024-12-10 DIAGNOSIS — I10 PRIMARY HYPERTENSION: ICD-10-CM

## 2024-12-10 DIAGNOSIS — R52 PAIN: ICD-10-CM

## 2024-12-10 DIAGNOSIS — Z51.5 HOSPICE CARE PATIENT: Primary | ICD-10-CM

## 2024-12-10 PROCEDURE — 99308 SBSQ NF CARE LOW MDM 20: CPT | Performed by: NURSE PRACTITIONER

## 2024-12-10 NOTE — LETTER
Patient: Marcelle Petit  : 1926    Encounter Date: 12/10/2024    Patient ID: Marcelle Petit is a 89 y.o. female who is long term resident being seen and evaluated for multiple medical problems.  [id number]   1926    Resp 16   Wt 59.4 kg (131 lb)   BMI 21.18 kg/m²     Assessment/Plan  Problem List Items Addressed This Visit       Primary hypertension     Losartan 50 mg by mouth daily   Cardizem  mg by mouth daily  H/O PAF         Pain     Morphine 5 mg  by mouth/ SL every 4 hrs as needed     Acetaminophen 650 mg by mouth every 6 hrs as needed         Hospice care patient - Primary     [facility]   Cerebral atherosclerosis              HPI: Client continues to receive [facility] services- Cerebral atherosclerosis. ROS completed with staff input. No reported SOB, CP, or tachypnea. O2 2 lit NC. No abdominal pain or N/V. No urinary concern. She has no current signs of heightened anxiety or pain.     Review of Systems Completed with staff input. Client alert but not verbalizing at this time     Physical Exam  Constitutional:       Comments: Sitting in bed    HENT:      Mouth/Throat:      Mouth: Mucous membranes are moist.   Cardiovascular:      Rate and Rhythm: Normal rate.      Comments: PMH PAF  Pulmonary:      Effort: Pulmonary effort is normal.      Breath sounds: Normal breath sounds.      Comments: 2 lit NC  Abdominal:      General: Bowel sounds are normal.   Musculoskeletal:      Comments: No leg edema    Skin:     General: Skin is warm and dry.   Neurological:      Mental Status: She is alert. Mental status is at baseline.   Psychiatric:         Mood and Affect: Mood normal.      Comments: No current signs of heightened anxiety or pain                    Electronically Signed By: VIKKI Echols   24  8:05 AM

## 2024-12-13 VITALS — BODY MASS INDEX: 21.18 KG/M2 | WEIGHT: 131 LBS | RESPIRATION RATE: 16 BRPM

## 2024-12-13 NOTE — PROGRESS NOTES
Patient ID: Marcelle Petit is a 89 y.o. female who is long term resident being seen and evaluated for multiple medical problems.  [id number]   7/6/1926    Resp 16   Wt 59.4 kg (131 lb)   BMI 21.18 kg/m²     Assessment/Plan  Problem List Items Addressed This Visit       Primary hypertension     Losartan 50 mg by mouth daily   Cardizem  mg by mouth daily  H/O PAF         Pain     Morphine 5 mg  by mouth/ SL every 4 hrs as needed     Acetaminophen 650 mg by mouth every 6 hrs as needed         Hospice care patient - Primary     [facility]   Cerebral atherosclerosis              HPI: Client continues to receive [facility] services- Cerebral atherosclerosis. ROS completed with staff input. No reported SOB, CP, or tachypnea. O2 2 lit NC. No abdominal pain or N/V. No urinary concern. She has no current signs of heightened anxiety or pain.     Review of Systems Completed with staff input. Client alert but not verbalizing at this time     Physical Exam  Constitutional:       Comments: Sitting in bed    HENT:      Mouth/Throat:      Mouth: Mucous membranes are moist.   Cardiovascular:      Rate and Rhythm: Normal rate.      Comments: PMH PAF  Pulmonary:      Effort: Pulmonary effort is normal.      Breath sounds: Normal breath sounds.      Comments: 2 lit NC  Abdominal:      General: Bowel sounds are normal.   Musculoskeletal:      Comments: No leg edema    Skin:     General: Skin is warm and dry.   Neurological:      Mental Status: She is alert. Mental status is at baseline.   Psychiatric:         Mood and Affect: Mood normal.      Comments: No current signs of heightened anxiety or pain

## 2025-01-14 ENCOUNTER — NURSING HOME VISIT (OUTPATIENT)
Dept: POST ACUTE CARE | Facility: EXTERNAL LOCATION | Age: OVER 89
End: 2025-01-14
Payer: MEDICARE

## 2025-01-14 DIAGNOSIS — R52 PAIN: ICD-10-CM

## 2025-01-14 DIAGNOSIS — Z51.5 HOSPICE CARE PATIENT: Primary | ICD-10-CM

## 2025-01-14 PROCEDURE — 99308 SBSQ NF CARE LOW MDM 20: CPT | Performed by: NURSE PRACTITIONER

## 2025-01-14 NOTE — LETTER
Patient: Marcelle Petit  : 1926    Encounter Date: 2025    Patient ID: Marcelle Petit is a 89 y.o. female who is long term resident being seen and evaluated for multiple medical problems.  [id number]   1926    /78   Pulse 88   Temp 36.8 °C (98.2 °F)   Resp 16   Wt 59.3 kg (130 lb 12.8 oz)   SpO2 98%   BMI 21.15 kg/m²     Assessment/Plan  Problem List Items Addressed This Visit       Pain     Morphine 5 mg  by mouth/ SL every 4 hrs as needed     Acetaminophen 650 mg by mouth every 6 hrs as needed         Hospice care patient - Primary     [facility]   Cerebral atherosclerosis              HPI: Client continues to receive [facility] services. ROS completed with staff input. No reported HA. No CP, SOB, or increased edema. O2  2 lit NC. No abdominal pain, N/V, diarrhea, or constipation. No reported urinary concern. She has no current signs of heightened anxiety or pain. Appears comfortable.     Review of Systems Completed with staff input    Physical Exam  Constitutional:       Comments: Resting in bed    HENT:      Mouth/Throat:      Mouth: Mucous membranes are moist.   Cardiovascular:      Rate and Rhythm: Normal rate.   Pulmonary:      Effort: Pulmonary effort is normal.      Comments: 2 lit NC  Abdominal:      General: Bowel sounds are normal.   Musculoskeletal:      Comments: Slight BLE/Foot edema    Skin:     General: Skin is warm and dry.   Neurological:      Mental Status: She is alert. Mental status is at baseline.      Comments: A/O x 1  No current meaningful conversation                   Electronically Signed By: VIKKI Echols   25  9:28 AM

## 2025-01-16 VITALS
SYSTOLIC BLOOD PRESSURE: 126 MMHG | HEART RATE: 88 BPM | BODY MASS INDEX: 21.15 KG/M2 | DIASTOLIC BLOOD PRESSURE: 78 MMHG | OXYGEN SATURATION: 98 % | TEMPERATURE: 98.2 F | RESPIRATION RATE: 16 BRPM | WEIGHT: 130.8 LBS

## 2025-01-16 NOTE — PROGRESS NOTES
Patient ID: Marcelle Petit is a 89 y.o. female who is long term resident being seen and evaluated for multiple medical problems.  [id number]   7/6/1926    /78   Pulse 88   Temp 36.8 °C (98.2 °F)   Resp 16   Wt 59.3 kg (130 lb 12.8 oz)   SpO2 98%   BMI 21.15 kg/m²     Assessment/Plan  Problem List Items Addressed This Visit       Pain     Morphine 5 mg  by mouth/ SL every 4 hrs as needed     Acetaminophen 650 mg by mouth every 6 hrs as needed         Hospice care patient - Primary     [facility]   Cerebral atherosclerosis              HPI: Client continues to receive [facility] services. ROS completed with staff input. No reported HA. No CP, SOB, or increased edema. O2  2 lit NC. No abdominal pain, N/V, diarrhea, or constipation. No reported urinary concern. She has no current signs of heightened anxiety or pain. Appears comfortable.     Review of Systems Completed with staff input    Physical Exam  Constitutional:       Comments: Resting in bed    HENT:      Mouth/Throat:      Mouth: Mucous membranes are moist.   Cardiovascular:      Rate and Rhythm: Normal rate.   Pulmonary:      Effort: Pulmonary effort is normal.      Comments: 2 lit NC  Abdominal:      General: Bowel sounds are normal.   Musculoskeletal:      Comments: Slight BLE/Foot edema    Skin:     General: Skin is warm and dry.   Neurological:      Mental Status: She is alert. Mental status is at baseline.      Comments: A/O x 1  No current meaningful conversation

## 2025-02-18 ENCOUNTER — NURSING HOME VISIT (OUTPATIENT)
Dept: POST ACUTE CARE | Facility: EXTERNAL LOCATION | Age: OVER 89
End: 2025-02-18
Payer: MEDICARE

## 2025-02-18 DIAGNOSIS — Z51.5 HOSPICE CARE PATIENT: Primary | ICD-10-CM

## 2025-02-18 PROCEDURE — 99308 SBSQ NF CARE LOW MDM 20: CPT | Performed by: NURSE PRACTITIONER

## 2025-02-18 NOTE — LETTER
Patient: Marcelle Petit  : 1926    Encounter Date: 2025    Patient ID: Marcelle Petit is a 89 y.o. female who is long term resident being seen and evaluated for multiple medical problems.  [id number]   1926    /68   Pulse 72   Temp 36.7 °C (98 °F)   Resp 16   Wt 58.9 kg (129 lb 12.8 oz)   SpO2 97%   BMI 20.99 kg/m²     Assessment/Plan  Problem List Items Addressed This Visit       Hospice care patient - Primary     [facility]   Cerebral atherosclerosis  Duoneb as prescribed every 6 hrs as needed   Acetaminophen 650 mg by mouth every 6 hrs as needed   Morphine 5 mg by mouth every 4 hrs as needed                 HPI: Client continues to receive [facility] services. ROS completed with staff input. No reported HA or dizziness. No CP, SOB,  or increased edema. O2 2 lit NC. No  abdominal pain or N/V. No urinary concern. She has no current signs of heightened anxiety , tachypnea, or pain.     Review of Systems Completed with staff input     Physical Exam  Constitutional:       Comments: Resting in bed  Appears comfortable    HENT:      Mouth/Throat:      Mouth: Mucous membranes are moist.   Cardiovascular:      Rate and Rhythm: Normal rate.   Pulmonary:      Effort: Pulmonary effort is normal.      Comments: 2 lit NC  Abdominal:      General: Bowel sounds are normal.   Musculoskeletal:      Comments: No leg edema    Skin:     General: Skin is warm and dry.   Neurological:      Mental Status: She is alert.   Psychiatric:      Comments: No current signs of heightened anxiety                    Electronically Signed By: VIKKI Echols   25  2:18 PM

## 2025-02-19 VITALS
WEIGHT: 129.8 LBS | TEMPERATURE: 98 F | OXYGEN SATURATION: 97 % | DIASTOLIC BLOOD PRESSURE: 68 MMHG | RESPIRATION RATE: 16 BRPM | BODY MASS INDEX: 20.99 KG/M2 | HEART RATE: 72 BPM | SYSTOLIC BLOOD PRESSURE: 112 MMHG

## 2025-02-19 NOTE — PROGRESS NOTES
Patient ID: Marcelle Petit is a 89 y.o. female who is long term resident being seen and evaluated for multiple medical problems.  [id number]   7/6/1926    /68   Pulse 72   Temp 36.7 °C (98 °F)   Resp 16   Wt 58.9 kg (129 lb 12.8 oz)   SpO2 97%   BMI 20.99 kg/m²     Assessment/Plan  Problem List Items Addressed This Visit       Hospice care patient - Primary     [facility]   Cerebral atherosclerosis  Duoneb as prescribed every 6 hrs as needed   Acetaminophen 650 mg by mouth every 6 hrs as needed   Morphine 5 mg by mouth every 4 hrs as needed                 HPI: Client continues to receive [facility] services. ROS completed with staff input. No reported HA or dizziness. No CP, SOB,  or increased edema. O2 2 lit NC. No  abdominal pain or N/V. No urinary concern. She has no current signs of heightened anxiety , tachypnea, or pain.     Review of Systems Completed with staff input     Physical Exam  Constitutional:       Comments: Resting in bed  Appears comfortable    HENT:      Mouth/Throat:      Mouth: Mucous membranes are moist.   Cardiovascular:      Rate and Rhythm: Normal rate.   Pulmonary:      Effort: Pulmonary effort is normal.      Comments: 2 lit NC  Abdominal:      General: Bowel sounds are normal.   Musculoskeletal:      Comments: No leg edema    Skin:     General: Skin is warm and dry.   Neurological:      Mental Status: She is alert.   Psychiatric:      Comments: No current signs of heightened anxiety

## 2025-02-19 NOTE — ASSESSMENT & PLAN NOTE
[facility]   Cerebral atherosclerosis  Duoneb as prescribed every 6 hrs as needed   Acetaminophen 650 mg by mouth every 6 hrs as needed   Morphine 5 mg by mouth every 4 hrs as needed

## 2025-03-18 ENCOUNTER — NURSING HOME VISIT (OUTPATIENT)
Dept: POST ACUTE CARE | Facility: EXTERNAL LOCATION | Age: OVER 89
End: 2025-03-18
Payer: MEDICARE

## 2025-03-18 DIAGNOSIS — Z51.5 HOSPICE CARE PATIENT: Primary | ICD-10-CM

## 2025-03-18 DIAGNOSIS — R06.2 WHEEZING: ICD-10-CM

## 2025-03-18 PROCEDURE — 99308 SBSQ NF CARE LOW MDM 20: CPT | Performed by: NURSE PRACTITIONER

## 2025-03-18 NOTE — LETTER
Patient: Marcelle Petit  : 1926    Encounter Date: 2025    Patient ID: Marcelle Petit is a 89 y.o. female who is long term resident being seen and evaluated for multiple medical problems.  [id number]   1926    /66   Pulse 70   Temp 36.1 °C (97 °F)   Resp 15   Wt 58.5 kg (128 lb 14.4 oz)   SpO2 97%   BMI 20.84 kg/m²     Assessment/Plan  Problem List Items Addressed This Visit       Hospice care patient - Primary     [facility]   Cerebral atherosclerosis  Duoneb as prescribed every 6 hrs as needed   Acetaminophen 650 mg by mouth every 6 hrs as needed   Morphine 5 mg by mouth every 4 hrs as needed          Wheezing     Duoneb as prescribed every 6 hrs as needed               HPI: Client continues to receive [facility] services. Client is not verbalizing at this time- makes eye contact. ROS completed with staff input. Client denies HA, dizziness, or lightheadedness. No reported CP, SOB, or increased edema. O2 2 lit NC. No reported  abdominal pain or N/V. No reported urinary concern. No current signs of heightened anxiety or pain.     Review of Systems Completed with staff input     Physical Exam  Constitutional:       Comments: Sitting in bed    HENT:      Mouth/Throat:      Mouth: Mucous membranes are moist.   Cardiovascular:      Rate and Rhythm: Normal rate.   Pulmonary:      Effort: Pulmonary effort is normal.      Comments: 2 lit NC  Abdominal:      General: Bowel sounds are normal.   Musculoskeletal:      Comments: Slight ankle edema    Skin:     General: Skin is warm and dry.   Neurological:      Mental Status: She is alert. Mental status is at baseline.      Comments: A/O x 1   Psychiatric:         Mood and Affect: Mood normal.                   Electronically Signed By: VIKKI Echols   3/19/25  2:44 PM

## 2025-03-19 VITALS
TEMPERATURE: 97 F | WEIGHT: 128.9 LBS | HEART RATE: 70 BPM | RESPIRATION RATE: 15 BRPM | DIASTOLIC BLOOD PRESSURE: 66 MMHG | OXYGEN SATURATION: 97 % | SYSTOLIC BLOOD PRESSURE: 118 MMHG | BODY MASS INDEX: 20.84 KG/M2

## 2025-03-19 PROBLEM — R06.2 WHEEZING: Status: ACTIVE | Noted: 2025-03-19

## 2025-03-19 NOTE — ASSESSMENT & PLAN NOTE
[facility]   Cerebral atherosclerosis  Duoneb as prescribed every 6 hrs as needed   Acetaminophen 650 mg by mouth every 6 hrs as needed   Morphine 5 mg by mouth every 4 hrs as needed

## 2025-03-19 NOTE — PROGRESS NOTES
Patient ID: Marcelle Petit is a 89 y.o. female who is long term resident being seen and evaluated for multiple medical problems.  [id number]   7/6/1926    /66   Pulse 70   Temp 36.1 °C (97 °F)   Resp 15   Wt 58.5 kg (128 lb 14.4 oz)   SpO2 97%   BMI 20.84 kg/m²     Assessment/Plan  Problem List Items Addressed This Visit       Hospice care patient - Primary     [facility]   Cerebral atherosclerosis  Duoneb as prescribed every 6 hrs as needed   Acetaminophen 650 mg by mouth every 6 hrs as needed   Morphine 5 mg by mouth every 4 hrs as needed          Wheezing     Duoneb as prescribed every 6 hrs as needed               HPI: Client continues to receive [facility] services. Client is not verbalizing at this time- makes eye contact. ROS completed with staff input. Client denies HA, dizziness, or lightheadedness. No reported CP, SOB, or increased edema. O2 2 lit NC. No reported  abdominal pain or N/V. No reported urinary concern. No current signs of heightened anxiety or pain.     Review of Systems Completed with staff input     Physical Exam  Constitutional:       Comments: Sitting in bed    HENT:      Mouth/Throat:      Mouth: Mucous membranes are moist.   Cardiovascular:      Rate and Rhythm: Normal rate.   Pulmonary:      Effort: Pulmonary effort is normal.      Comments: 2 lit NC  Abdominal:      General: Bowel sounds are normal.   Musculoskeletal:      Comments: Slight ankle edema    Skin:     General: Skin is warm and dry.   Neurological:      Mental Status: She is alert. Mental status is at baseline.      Comments: A/O x 1   Psychiatric:         Mood and Affect: Mood normal.

## 2025-04-22 ENCOUNTER — NURSING HOME VISIT (OUTPATIENT)
Dept: POST ACUTE CARE | Facility: EXTERNAL LOCATION | Age: OVER 89
End: 2025-04-22
Payer: MEDICARE

## 2025-04-22 DIAGNOSIS — K59.00 CONSTIPATION, UNSPECIFIED CONSTIPATION TYPE: ICD-10-CM

## 2025-04-22 DIAGNOSIS — Z51.5 HOSPICE CARE PATIENT: Primary | ICD-10-CM

## 2025-04-22 NOTE — LETTER
Patient: Marcelle Petit  : 1926    Encounter Date: 2025    Patient ID: Marcelle Petit is a 89 y.o. female who is long term resident being seen and evaluated for multiple medical problems.  [id number]   1926    Pulse 80   Resp 15   Wt 57.6 kg (127 lb)   BMI 20.53 kg/m²     Assessment/Plan  Problem List Items Addressed This Visit       Hospice care patient - Primary    [facility]   Cerebral atherosclerosis  Duoneb as prescribed every 6 hrs as needed   Acetaminophen 650 mg by mouth every 6 hrs as needed   Morphine 5 mg by mouth every 4 hrs as needed          Constipation    Fleets enema as prescribed every 24 hours as needed              HPI: Client continues to receive [facility] services.  She is not verbalizing at this time.  ROS completed with staff input.  No reported chest pain or shortness of breath.  O2 2 L nasal cannula.  No reported signs of abdominal pain, nausea, or vomiting.  No reported urinary concerns.  She does have some expected weight loss.  She has no current signs of heightened anxiety, tachypnea or pain.    Note completed with [employer].  Please excuse any grammatical errors.    Review of Systems completed with staff input.    Physical Exam  Constitutional:       Comments: Resting in bed   HENT:      Head: Normocephalic.      Mouth/Throat:      Mouth: Mucous membranes are moist.   Cardiovascular:      Rate and Rhythm: Normal rate. Rhythm irregular.   Pulmonary:      Effort: Pulmonary effort is normal.      Comments: No tachypnea  O2 2 L nasal cannula  Abdominal:      General: Bowel sounds are normal.   Musculoskeletal:      Comments: Slight ankle edema   Skin:     General: Skin is warm and dry.   Neurological:      Mental Status: She is alert.   Psychiatric:      Comments: Appears comfortable with no current signs of heightened anxiety or pain.                   Electronically Signed By: VIKKI Echols   25  7:14 PM

## 2025-04-25 VITALS — RESPIRATION RATE: 15 BRPM | HEART RATE: 80 BPM | WEIGHT: 127 LBS | BODY MASS INDEX: 20.53 KG/M2

## 2025-04-25 PROBLEM — K59.00 CONSTIPATION: Status: ACTIVE | Noted: 2025-04-25

## 2025-04-25 NOTE — ASSESSMENT & PLAN NOTE
[facility]   Cerebral atherosclerosis  Duoneb as prescribed every 6 hrs as needed   Acetaminophen 650 mg by mouth every 6 hrs as needed   Morphine 5 mg by mouth every 4 hrs as needed

## 2025-04-25 NOTE — PROGRESS NOTES
Patient ID: Marcelle Petit is a 89 y.o. female who is long term resident being seen and evaluated for multiple medical problems.  [id number]   7/6/1926    Pulse 80   Resp 15   Wt 57.6 kg (127 lb)   BMI 20.53 kg/m²     Assessment/Plan  Problem List Items Addressed This Visit       Hospice care patient - Primary    [facility]   Cerebral atherosclerosis  Duoneb as prescribed every 6 hrs as needed   Acetaminophen 650 mg by mouth every 6 hrs as needed   Morphine 5 mg by mouth every 4 hrs as needed          Constipation    Fleets enema as prescribed every 24 hours as needed              HPI: Client continues to receive [facility] services.  She is not verbalizing at this time.  ROS completed with staff input.  No reported chest pain or shortness of breath.  O2 2 L nasal cannula.  No reported signs of abdominal pain, nausea, or vomiting.  No reported urinary concerns.  She does have some expected weight loss.  She has no current signs of heightened anxiety, tachypnea or pain.    Note completed with [employer].  Please excuse any grammatical errors.    Review of Systems completed with staff input.    Physical Exam  Constitutional:       Comments: Resting in bed   HENT:      Head: Normocephalic.      Mouth/Throat:      Mouth: Mucous membranes are moist.   Cardiovascular:      Rate and Rhythm: Normal rate. Rhythm irregular.   Pulmonary:      Effort: Pulmonary effort is normal.      Comments: No tachypnea  O2 2 L nasal cannula  Abdominal:      General: Bowel sounds are normal.   Musculoskeletal:      Comments: Slight ankle edema   Skin:     General: Skin is warm and dry.   Neurological:      Mental Status: She is alert.   Psychiatric:      Comments: Appears comfortable with no current signs of heightened anxiety or pain.